# Patient Record
Sex: FEMALE | Race: BLACK OR AFRICAN AMERICAN
[De-identification: names, ages, dates, MRNs, and addresses within clinical notes are randomized per-mention and may not be internally consistent; named-entity substitution may affect disease eponyms.]

---

## 2018-03-29 ENCOUNTER — HOSPITAL ENCOUNTER (EMERGENCY)
Dept: HOSPITAL 74 - JERFT | Age: 78
Discharge: HOME | End: 2018-03-29
Payer: COMMERCIAL

## 2018-03-29 VITALS — SYSTOLIC BLOOD PRESSURE: 138 MMHG | TEMPERATURE: 97.9 F | HEART RATE: 98 BPM | DIASTOLIC BLOOD PRESSURE: 80 MMHG

## 2018-03-29 VITALS — BODY MASS INDEX: 23.8 KG/M2
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
## 2018-03-29 DIAGNOSIS — S82.831D: Primary | ICD-10-CM

## 2018-03-29 PROCEDURE — 2W3QX1Z IMMOBILIZATION OF RIGHT LOWER LEG USING SPLINT: ICD-10-PCS

## 2018-03-29 NOTE — PDOC
History of Present Illness





- General


Chief Complaint: Injury


Stated Complaint: RT ANKLE PAIN


Time Seen by Provider: 03/29/18 10:53


History Source: Patient


Exam Limitations: No Limitations





- History of Present Illness


Initial Comments: 





03/29/18 11:13


Patient came with daughter for evaluation of right ankle and leg pain. States 

twisted ankle 2 days ago. Was seen at Dr. Gunn's office, x-ray was taken and 

noted to have a distal fibular fracture. Was instructed to follow-up with 

orthopedist whom they called and were given appointment for 2 weeks.


03/29/18 12:08





Occurred: reports: yesterday


Severity: reports: mild, moderate


Pain Location: reports: none


Method of Injury: Yes: unknown


Modifying Factors: improves with: None, cold therapy


Loss of Consciousness: no loss of consciousness


Associated Symptoms (Fall): denies symptoms





Past History





- Travel


Traveled outside of the country in the last 30 days: No


Close contact w/someone who was outside of country & ill: No





- Past Medical History


Allergies/Adverse Reactions: 


 Allergies











Allergy/AdvReac Type Severity Reaction Status Date / Time


 


No Known Drug Allergies Allergy   Verified 03/29/18 10:44











Home Medications: 


Ambulatory Orders





Aspirin Coated [Ecotrin] 81 mg PO DAILY 11/25/12 


Amlodipine/Valsartan/Hcthiazid [Amlod-Valsa-Hctz -25 mg] 1 each PO DAILY 

02/26/16 


Oxybutynin Chloride 5 mg PO DAILY 02/26/16 


Cholecalciferol (Vitamin D3) [Vitamin D] 1,000 unit PO DAILY 06/24/16 


Cyanocobalamin [Vitamin B12 -] 1,000 mcg PO DAILY 06/24/16 


Memantine HCl [Namenda Xr] 28 mg PO HS 06/24/16 








Anemia: No


Asthma: No


Cancer: No


Cardiac Disorders: No


CVA: No


COPD: No


CHF: No


Dementia: No


Diabetes: No


GI Disorders: No


 Disorders: No


HTN: Yes


Hypercholesterolemia: No


Liver Disease: No


Seizures: No


Thyroid Disease: No





- Immunization History


Immunization Up to Date: Yes (FLU)





- Suicide/Smoking/Psychosocial Hx


Smoking Status: No


Smoking History: Never smoked


Number of Cigarettes Smoked Daily: 0


Hx Alcohol Use: No


Drug/Substance Use Hx: No


Substance Use Type: None





Trauma Specific PMHX





- Complaint Specific PMHX


Back Injury: No


Neck Injury: No





**Review of Systems





- Review of Systems


Able to Perform ROS?: Yes


Is the patient limited English proficient: Yes


Constitutional: Yes: Symptoms Reported, See HPI, Malaise


HEENTM: No: Symptoms Reported


Musculoskeletal: Yes: Symptoms Reported, See HPI, Joint Pain, Joint Swelling, 

Joint Stiffness


Integumentary: Yes: Symptoms Reported


Neurological: Yes: Symptoms reported, See HPI.  No: Numbness


All Other Systems: Reviewed and Negative





*Physical Exam





- Vital Signs


 Last Vital Signs











Temp Pulse Resp BP Pulse Ox


 


 97.9 F   98 H  17   138/80   99 


 


 03/29/18 10:44  03/29/18 10:44  03/29/18 10:44  03/29/18 10:44  03/29/18 10:44














- Physical Exam


General Appearance: Yes: Nourished, Appropriately Dressed, Apparent Distress


HEENT: positive: REI, Normal ENT Inspection, TMs Normal, Pharynx Normal


Neck: positive: Supple.  negative: Tender, Lymphadenopathy (R)


Respiratory/Chest: positive: Lungs Clear, Normal Breath Sounds


Gastrointestinal/Abdominal: positive: Soft.  negative: Normal Bowel Sounds


Musculoskeletal: positive: Normal Inspection, Decreased Range of Motion


Extremity: positive: Normal Capillary Refill, Normal Range of Motion, Tender, 

Swelling


Integumentary: positive: Swelling, Ecchymosis, Bruising


Neurologic: positive: CNs II-XII NML intact, Fully Oriented, Alert, Normal Mood/

Affect, Normal Response, Motor Strength 5/5





ED Treatment Course





- RADIOLOGY


Radiology Studies Ordered: 














 Category Date Time Status


 


 ANKLE-RIGHT [RAD] Stat Radiology  03/29/18 11:09 Ordered














Progress Note





- Progress Note


Progress Note: 





X-ray reveals distal fibular fracture with what seems to be some changes in the 

ankle mortise. Case was discussed with Dr. Hernandez who for further treatment 

including casting and walker instructions. Patient was discharged here in via 

wheelchair was taken to Dr. Hayden office for definitive





*DC/Admit/Observation/Transfer


Diagnosis at time of Disposition: 


Fracture of distal end of fibula


Qualifiers:


 Encounter type: initial encounter Fracture type: closed Fracture morphology: 

other fracture Laterality: right Qualified Code(s): S82.831A - Other fracture 

of upper and lower end of right fibula, initial encounter for closed fracture








- Discharge Dispostion


Disposition: HOME


Condition at time of disposition: Stable


Admit: No





- Referrals


Referrals: 


Velia,Kuldip, MD [Primary Care Provider] - 


Artemio Hernandez MD [Staff Physician] - 





- Patient Instructions


Printed Discharge Instructions:  DI for Ankle Fracture


Additional Instructions: 





Avoid heavy lifting or exercise until pain and swelling is resolved or until 

further directed


Keep area highly elevated to reduce swelling


Use splints/Ace wrap as directed 


Followup with orthopedist when directed 


    if significantly improved may wait one week for followup with orthopedist





May use ibuprofen 2-200 mg tablets every 6 hours as needed for pain





- Post Discharge Activity

## 2019-02-13 ENCOUNTER — HOSPITAL ENCOUNTER (INPATIENT)
Dept: HOSPITAL 74 - JER | Age: 79
LOS: 1 days | Discharge: HOME HEALTH | DRG: 690 | End: 2019-02-14
Attending: INTERNAL MEDICINE | Admitting: INTERNAL MEDICINE
Payer: COMMERCIAL

## 2019-02-13 VITALS — BODY MASS INDEX: 26.6 KG/M2

## 2019-02-13 DIAGNOSIS — F03.90: ICD-10-CM

## 2019-02-13 DIAGNOSIS — R00.0: ICD-10-CM

## 2019-02-13 DIAGNOSIS — D75.89: ICD-10-CM

## 2019-02-13 DIAGNOSIS — N39.0: Primary | ICD-10-CM

## 2019-02-13 DIAGNOSIS — R32: ICD-10-CM

## 2019-02-13 DIAGNOSIS — N18.9: ICD-10-CM

## 2019-02-13 DIAGNOSIS — M06.9: ICD-10-CM

## 2019-02-13 DIAGNOSIS — I12.9: ICD-10-CM

## 2019-02-13 DIAGNOSIS — Z98.61: ICD-10-CM

## 2019-02-13 DIAGNOSIS — I25.10: ICD-10-CM

## 2019-02-13 DIAGNOSIS — J06.9: ICD-10-CM

## 2019-02-13 LAB
ALBUMIN SERPL-MCNC: 3 G/DL (ref 3.4–5)
ALBUMIN SERPL-MCNC: 3.5 G/DL (ref 3.4–5)
ALP SERPL-CCNC: 60 U/L (ref 45–117)
ALP SERPL-CCNC: 75 U/L (ref 45–117)
ALT SERPL-CCNC: 23 U/L (ref 13–61)
ALT SERPL-CCNC: 32 U/L (ref 13–61)
ANION GAP SERPL CALC-SCNC: 3 MMOL/L (ref 8–16)
ANION GAP SERPL CALC-SCNC: 4 MMOL/L (ref 8–16)
APPEARANCE UR: CLEAR
APTT BLD: 31.9 SECONDS (ref 25.2–36.5)
AST SERPL-CCNC: 15 U/L (ref 15–37)
AST SERPL-CCNC: 20 U/L (ref 15–37)
BASOPHILS # BLD: 0.7 % (ref 0–2)
BASOPHILS # BLD: 1.2 % (ref 0–2)
BILIRUB SERPL-MCNC: 0.3 MG/DL (ref 0.2–1)
BILIRUB SERPL-MCNC: 0.3 MG/DL (ref 0.2–1)
BILIRUB UR STRIP.AUTO-MCNC: NEGATIVE MG/DL
BNP SERPL-MCNC: 170 PG/ML (ref 5–450)
BUN SERPL-MCNC: 14 MG/DL (ref 7–18)
BUN SERPL-MCNC: 16 MG/DL (ref 7–18)
CALCIUM SERPL-MCNC: 8.6 MG/DL (ref 8.5–10.1)
CALCIUM SERPL-MCNC: 9.4 MG/DL (ref 8.5–10.1)
CHLORIDE SERPL-SCNC: 105 MMOL/L (ref 98–107)
CHLORIDE SERPL-SCNC: 98 MMOL/L (ref 98–107)
CO2 SERPL-SCNC: 31 MMOL/L (ref 21–32)
CO2 SERPL-SCNC: 34 MMOL/L (ref 21–32)
COLOR UR: (no result)
CREAT SERPL-MCNC: 1.1 MG/DL (ref 0.55–1.3)
CREAT SERPL-MCNC: 1.3 MG/DL (ref 0.55–1.3)
DEPRECATED RDW RBC AUTO: 15.3 % (ref 11.6–15.6)
DEPRECATED RDW RBC AUTO: 15.4 % (ref 11.6–15.6)
EOSINOPHIL # BLD: 2.7 % (ref 0–4.5)
EOSINOPHIL # BLD: 3.1 % (ref 0–4.5)
EPITH CASTS URNS QL MICRO: (no result) /HPF
ERYTHROCYTE [SEDIMENTATION RATE] IN BLOOD BY WESTERGREN METHOD: 40 MM/HR (ref 0–30)
GLUCOSE SERPL-MCNC: 90 MG/DL (ref 74–106)
GLUCOSE SERPL-MCNC: 97 MG/DL (ref 74–106)
HCT VFR BLD CALC: 30.3 % (ref 32.4–45.2)
HCT VFR BLD CALC: 34.1 % (ref 32.4–45.2)
HGB BLD-MCNC: 10.4 GM/DL (ref 10.7–15.3)
HGB BLD-MCNC: 11.9 GM/DL (ref 10.7–15.3)
HYALINE CASTS URNS QL MICRO: 1 /LPF
INR BLD: 1.04 (ref 0.83–1.09)
KETONES UR QL STRIP: NEGATIVE
LEUKOCYTE ESTERASE UR QL STRIP.AUTO: (no result)
LYMPHOCYTES # BLD: 24.5 % (ref 8–40)
LYMPHOCYTES # BLD: 27.5 % (ref 8–40)
MAGNESIUM SERPL-MCNC: 2.3 MG/DL (ref 1.8–2.4)
MCH RBC QN AUTO: 33.7 PG (ref 25.7–33.7)
MCH RBC QN AUTO: 34.2 PG (ref 25.7–33.7)
MCHC RBC AUTO-ENTMCNC: 34.2 G/DL (ref 32–36)
MCHC RBC AUTO-ENTMCNC: 34.8 G/DL (ref 32–36)
MCV RBC: 98.4 FL (ref 80–96)
MCV RBC: 98.5 FL (ref 80–96)
MONOCYTES # BLD AUTO: 7.4 % (ref 3.8–10.2)
MONOCYTES # BLD AUTO: 8.9 % (ref 3.8–10.2)
NEUTROPHILS # BLD: 60.2 % (ref 42.8–82.8)
NEUTROPHILS # BLD: 63.8 % (ref 42.8–82.8)
NITRITE UR QL STRIP: NEGATIVE
PH BLDV: 7.39 [PH] (ref 7.32–7.42)
PH UR: 7 [PH] (ref 5–8)
PHOSPHATE SERPL-MCNC: 3 MG/DL (ref 2.5–4.9)
PLATELET # BLD AUTO: 275 K/MM3 (ref 134–434)
PLATELET # BLD AUTO: 304 K/MM3 (ref 134–434)
PMV BLD: 8.2 FL (ref 7.5–11.1)
PMV BLD: 8.4 FL (ref 7.5–11.1)
POTASSIUM SERPLBLD-SCNC: 3.7 MMOL/L (ref 3.5–5.1)
POTASSIUM SERPLBLD-SCNC: 4.4 MMOL/L (ref 3.5–5.1)
PROT SERPL-MCNC: 6.4 G/DL (ref 6.4–8.2)
PROT SERPL-MCNC: 7.6 G/DL (ref 6.4–8.2)
PROT UR QL STRIP: NEGATIVE
PROT UR QL STRIP: NEGATIVE
PT PNL PPP: 12.3 SEC (ref 9.7–13)
RBC # BLD AUTO: 3.08 M/MM3 (ref 3.6–5.2)
RBC # BLD AUTO: 3.47 M/MM3 (ref 3.6–5.2)
SODIUM SERPL-SCNC: 135 MMOL/L (ref 136–145)
SODIUM SERPL-SCNC: 139 MMOL/L (ref 136–145)
SP GR UR: 1.01 (ref 1.01–1.03)
UROBILINOGEN UR STRIP-MCNC: NEGATIVE MG/DL (ref 0.2–1)
VENOUS PC02: 54 MMHG (ref 38–52)
VENOUS PO2: 13.4 MMHG (ref 28–48)
WBC # BLD AUTO: 8.5 K/MM3 (ref 4–10)
WBC # BLD AUTO: 9.8 K/MM3 (ref 4–10)

## 2019-02-13 PROCEDURE — G0378 HOSPITAL OBSERVATION PER HR: HCPCS

## 2019-02-13 RX ADMIN — ASPIRIN SCH MG: 81 TABLET, COATED ORAL at 10:16

## 2019-02-13 RX ADMIN — CLOPIDOGREL BISULFATE SCH MG: 75 TABLET, FILM COATED ORAL at 10:16

## 2019-02-13 RX ADMIN — OXYBUTYNIN CHLORIDE SCH MG: 5 TABLET ORAL at 10:16

## 2019-02-13 RX ADMIN — IMIPRAMINE HYDROCHLORIDE SCH MG: 25 TABLET ORAL at 11:51

## 2019-02-13 RX ADMIN — SODIUM CHLORIDE SCH MLS/HR: 9 INJECTION, SOLUTION INTRAVENOUS at 03:12

## 2019-02-13 RX ADMIN — HEPARIN SODIUM SCH UNIT: 5000 INJECTION, SOLUTION INTRAVENOUS; SUBCUTANEOUS at 21:42

## 2019-02-13 RX ADMIN — VITAMIN D, TAB 1000IU (100/BT) SCH UNIT: 25 TAB at 10:16

## 2019-02-13 RX ADMIN — LOSARTAN POTASSIUM SCH MG: 25 TABLET, FILM COATED ORAL at 10:15

## 2019-02-13 RX ADMIN — METOPROLOL TARTRATE SCH MG: 25 TABLET, FILM COATED ORAL at 10:16

## 2019-02-13 RX ADMIN — AMLODIPINE BESYLATE SCH MG: 5 TABLET ORAL at 10:16

## 2019-02-13 RX ADMIN — HEPARIN SODIUM SCH UNIT: 5000 INJECTION, SOLUTION INTRAVENOUS; SUBCUTANEOUS at 10:16

## 2019-02-13 RX ADMIN — DONEPEZIL HYDROCHLORIDE SCH MG: 5 TABLET, FILM COATED ORAL at 10:18

## 2019-02-13 RX ADMIN — ALLOPURINOL SCH MG: 100 TABLET ORAL at 10:16

## 2019-02-13 NOTE — PN
Physical Exam: 


SUBJECTIVE: Patient seen and examined





No new issues overnight. 


States she came to hospital because of flu like symptoms and has no burning 

micturation or change in frequency. 


overnight no new issues 








OBJECTIVE:





 Vital Signs











 Period  Temp  Pulse  Resp  BP Sys/Veloz  Pulse Ox


 


 Last 24 Hr  98.4 F-101.6 F    16-20  127-146/86-88  99-99











GENERAL: a/o x 3, 


EYES: Pupils equal, round and reactive to light, 


EARS, NOSE, THROAT: moist  mucous membranes


NECK: supple without lymphadenopathy, JVD, or masses.


LUNGS: Breath sounds equal, clear to auscultation bilaterally. No wheezes, and 

no crackles. 


HEART: RRR, no murmurs appreciated


ABDOMEN: Soft, nontender, not distended, normoactive bowel sounds, no guarding, 

no rebound, no masses.


LOWER EXTREMITIES: 2+ pulses,  No peripheral edema. 


SKIN: dry














 Laboratory Results - last 24 hr











  02/13/19 02/13/19 02/13/19





  01:13 01:13 01:13


 


WBC  9.8  


 


RBC  3.47 L  


 


Hgb  11.9  


 


Hct  34.1  


 


MCV  98.5 H  


 


MCH  34.2 H  


 


MCHC  34.8  


 


RDW  15.4  D  


 


Plt Count  304  


 


MPV  8.4  


 


Absolute Neuts (auto)  6.3  


 


Neutrophils %  63.8  D  


 


Lymphocytes %  24.5  D  


 


Monocytes %  7.4  


 


Eosinophils %  3.1  


 


Basophils %  1.2  


 


Nucleated RBC %  0  


 


ESR   


 


PT with INR   12.30 


 


INR   1.04 


 


PTT (Actin FS)   31.9 


 


VBG pH    7.39


 


POC VBG pCO2    54.0 H


 


POC VBG pO2    13.4 L*


 


Mixed VBG HCO3    32.2 H


 


Sodium   


 


Potassium   


 


Chloride   


 


Carbon Dioxide   


 


Anion Gap   


 


BUN   


 


Creatinine   


 


Creat Clearance w eGFR   


 


Random Glucose   


 


Lactic Acid   


 


Calcium   


 


Phosphorus   


 


Magnesium   


 


Total Bilirubin   


 


AST   


 


ALT   


 


Alkaline Phosphatase   


 


Troponin I   


 


C-Reactive Protein   


 


B-Natriuretic Peptide   


 


Total Protein   


 


Albumin   


 


Urine Color   


 


Urine Appearance   


 


Urine pH   


 


Ur Specific Gravity   


 


Urine Protein   


 


Urine Glucose (UA)   


 


Urine Ketones   


 


Urine Blood   


 


Urine Nitrite   


 


Urine Bilirubin   


 


Urine Urobilinogen   


 


Ur Leukocyte Esterase   


 


Urine WBC (Auto)   


 


Urine RBC (Auto)   


 


Ur Epithelial Cells   


 


Hyaline Casts   


 


Influenza A (Rapid)   


 


Influenza B (Rapid)   














  02/13/19 02/13/19 02/13/19





  01:13 01:13 01:13


 


WBC   


 


RBC   


 


Hgb   


 


Hct   


 


MCV   


 


MCH   


 


MCHC   


 


RDW   


 


Plt Count   


 


MPV   


 


Absolute Neuts (auto)   


 


Neutrophils %   


 


Lymphocytes %   


 


Monocytes %   


 


Eosinophils %   


 


Basophils %   


 


Nucleated RBC %   


 


ESR   


 


PT with INR   


 


INR   


 


PTT (Actin FS)   


 


VBG pH   


 


POC VBG pCO2   


 


POC VBG pO2   


 


Mixed VBG HCO3   


 


Sodium  135 L  


 


Potassium  4.4  


 


Chloride  98  


 


Carbon Dioxide  34 H  


 


Anion Gap  3 L  


 


BUN  16  


 


Creatinine  1.3  


 


Creat Clearance w eGFR  39.41  


 


Random Glucose  97  


 


Lactic Acid   1.3 


 


Calcium  9.4  


 


Phosphorus   


 


Magnesium   


 


Total Bilirubin  0.3  


 


AST  20  


 


ALT  32  


 


Alkaline Phosphatase  75  


 


Troponin I    < 0.02


 


C-Reactive Protein   


 


B-Natriuretic Peptide   


 


Total Protein  7.6  


 


Albumin  3.5  


 


Urine Color   


 


Urine Appearance   


 


Urine pH   


 


Ur Specific Gravity   


 


Urine Protein   


 


Urine Glucose (UA)   


 


Urine Ketones   


 


Urine Blood   


 


Urine Nitrite   


 


Urine Bilirubin   


 


Urine Urobilinogen   


 


Ur Leukocyte Esterase   


 


Urine WBC (Auto)   


 


Urine RBC (Auto)   


 


Ur Epithelial Cells   


 


Hyaline Casts   


 


Influenza A (Rapid)   


 


Influenza B (Rapid)   














  02/13/19 02/13/19 02/13/19





  01:13 01:45 06:00


 


WBC    8.5


 


RBC    3.08 L


 


Hgb    10.4 L


 


Hct    30.3 L


 


MCV    98.4 H


 


MCH    33.7


 


MCHC    34.2


 


RDW    15.3


 


Plt Count    275


 


MPV    8.2


 


Absolute Neuts (auto)    5.2


 


Neutrophils %    60.2


 


Lymphocytes %    27.5


 


Monocytes %    8.9


 


Eosinophils %    2.7


 


Basophils %    0.7


 


Nucleated RBC %    0


 


ESR    40 H


 


PT with INR   


 


INR   


 


PTT (Actin FS)   


 


VBG pH   


 


POC VBG pCO2   


 


POC VBG pO2   


 


Mixed VBG HCO3   


 


Sodium   


 


Potassium   


 


Chloride   


 


Carbon Dioxide   


 


Anion Gap   


 


BUN   


 


Creatinine   


 


Creat Clearance w eGFR   


 


Random Glucose   


 


Lactic Acid   


 


Calcium   


 


Phosphorus   


 


Magnesium   


 


Total Bilirubin   


 


AST   


 


ALT   


 


Alkaline Phosphatase   


 


Troponin I   


 


C-Reactive Protein   


 


B-Natriuretic Peptide   


 


Total Protein   


 


Albumin   


 


Urine Color   Ltyellow 


 


Urine Appearance   Clear 


 


Urine pH   7.0 


 


Ur Specific Gravity   1.012 


 


Urine Protein   Negative 


 


Urine Glucose (UA)   Negative 


 


Urine Ketones   Negative 


 


Urine Blood   1+ H 


 


Urine Nitrite   Negative 


 


Urine Bilirubin   Negative 


 


Urine Urobilinogen   Negative 


 


Ur Leukocyte Esterase   3+ H 


 


Urine WBC (Auto)   57 


 


Urine RBC (Auto)   22 


 


Ur Epithelial Cells   Rare 


 


Hyaline Casts   1 


 


Influenza A (Rapid)  Negative  


 


Influenza B (Rapid)  Negative  














  02/13/19





  06:00


 


WBC 


 


RBC 


 


Hgb 


 


Hct 


 


MCV 


 


MCH 


 


MCHC 


 


RDW 


 


Plt Count 


 


MPV 


 


Absolute Neuts (auto) 


 


Neutrophils % 


 


Lymphocytes % 


 


Monocytes % 


 


Eosinophils % 


 


Basophils % 


 


Nucleated RBC % 


 


ESR 


 


PT with INR 


 


INR 


 


PTT (Actin FS) 


 


VBG pH 


 


POC VBG pCO2 


 


POC VBG pO2 


 


Mixed VBG HCO3 


 


Sodium  139


 


Potassium  3.7


 


Chloride  105


 


Carbon Dioxide  31


 


Anion Gap  4 L


 


BUN  14


 


Creatinine  1.1


 


Creat Clearance w eGFR  47.79


 


Random Glucose  90


 


Lactic Acid 


 


Calcium  8.6


 


Phosphorus  3.0


 


Magnesium  2.3


 


Total Bilirubin  0.3


 


AST  15


 


ALT  23


 


Alkaline Phosphatase  60


 


Troponin I 


 


C-Reactive Protein  9.0 H


 


B-Natriuretic Peptide  170.0


 


Total Protein  6.4


 


Albumin  3.0 L


 


Urine Color 


 


Urine Appearance 


 


Urine pH 


 


Ur Specific Gravity 


 


Urine Protein 


 


Urine Glucose (UA) 


 


Urine Ketones 


 


Urine Blood 


 


Urine Nitrite 


 


Urine Bilirubin 


 


Urine Urobilinogen 


 


Ur Leukocyte Esterase 


 


Urine WBC (Auto) 


 


Urine RBC (Auto) 


 


Ur Epithelial Cells 


 


Hyaline Casts 


 


Influenza A (Rapid) 


 


Influenza B (Rapid) 








Active Medications











Generic Name Dose Route Start Last Admin





  Trade Name Freq  PRN Reason Stop Dose Admin


 


Acetaminophen  650 mg  02/13/19 03:18  





  Tylenol -  PO   





  Q6H PRN   





  FEVER   





     





     





     


 


Allopurinol  100 mg  02/13/19 10:00  02/13/19 10:16





  Zyloprim -  PO   100 mg





  DAILY JUAN   Administration





     





     





     





     


 


Amlodipine Besylate  5 mg  02/13/19 10:00  02/13/19 10:16





  Norvasc -  PO   5 mg





  DAILY JUAN   Administration





     





     





     





     


 


Aspirin  81 mg  02/13/19 10:00  02/13/19 10:16





  Ecotrin -  PO   81 mg





  DAILY JUAN   Administration





     





     





     





     


 


Cholecalciferol  1,000 unit  02/13/19 10:00  02/13/19 10:16





  Vitamin D3 -  PO   1,000 unit





  DAILY JUAN   Administration





     





     





     





     


 


Clopidogrel Bisulfate  75 mg  02/13/19 10:00  02/13/19 10:16





  Plavix -  PO   75 mg





  DAILY JUAN   Administration





     





     





     





     


 


Donepezil HCl  5 mg  02/13/19 10:00  02/13/19 10:18





  Aricept -  PO   5 mg





  DAILY JUAN   Administration





     





     





     





     


 


Heparin Sodium (Porcine)  5,000 unit  02/13/19 10:00  02/13/19 10:16





  Heparin -  SQ   5,000 unit





  BID JUAN   Administration





     





     





     





     


 


Sodium Chloride  1,000 mls @ 75 mls/hr  02/13/19 03:00  02/13/19 03:12





  Normal Saline -  IV   75 mls/hr





  ASDIR JUAN   Administration





     





     





     





     


 


Ceftriaxone Sodium 1 gm/  50 mls @ 100 mls/hr  02/13/19 22:00  





  Dextrose  IVPB   





  [facility]   





     





     





  Protocol   





     


 


Imipramine HCl  50 mg  02/13/19 10:00  





  Tofranil -  PO   





  DAILY JUAN   





     





     





     





     


 


Losartan Potassium  25 mg  02/13/19 10:00  02/13/19 10:15





  Cozaar -  PO   25 mg





  DAILY JUAN   Administration





     





     





     





     


 


Metoprolol Tartrate  25 mg  02/13/19 10:00  02/13/19 10:16





  Lopressor -  PO   25 mg





  DAILY JUAN   Administration





     





     





     





     


 


Non-Formulary Medication  1 each  02/13/19 10:00  





  Oxybutynin [Oxytrol]  TD   





  DAILY [facility]   





     





     





     





     


 


Oxybutynin Chloride  5 mg  02/13/19 10:00  02/13/19 10:16





  Ditropan -  PO   5 mg





  DAILY JUAN   Administration





     





     





     





     











ASSESSMENT/PLAN:79 yo F with a PMHx of HTN, a "muscle disease", MI s/p 2 stents(

7/18), presented with cough and fever and found to be septic.





#uti 


-one reading of fever since yesterday 


-UA: 3+ LE, 57 WBC


- conitinue IV antibiotic. We will wait for culture 


-75ml/hour NS. Stop once pt has good po intake 


-Tylenol PRN for fever








#CKD


-at baseline





#CAD/HTN 


-cont. Plavix 75mg


-cont Amlodipine 5mg


-Cont. Losartan 25


-Cont. Toprol 25


- continue aspirin


-med rec patient





#RA


hold methotrexate for now can take it from next week 





#Urinary incontinence


-cont. home oxybutynin





#FEN


-NS @75


-monitor


-sodium restricted





#DVT


-heparin sq








Visit type





- Emergency Visit


Emergency Visit: Yes


ED Registration Date: 02/13/19


Care time: The patient presented to the Emergency Department on the above date 

and was hospitalized for further evaluation of their emergent condition.





- New Patient


This patient is new to me today: Yes


Date on this admission: 02/13/19





- Critical Care


Critical Care patient: No

## 2019-02-13 NOTE — PDOC
History of Present Illness





- General


Chief Complaint: SIRS, Suspected/Possible


Stated Complaint: FEVER/WEAKNESS


Time Seen by Provider: 02/13/19 01:05


History Source: Patient, Family


Exam Limitations: No Limitations





- History of Present Illness


Initial Comments: 





02/13/19 02:03


The patient is an 78F with a PMH of HTN, mild dementia, and RA who presents to 

the ER with complaints of fever. The patient states that she's had intermittent 

fevers x 1.5 weeks with a cough. She denies CP, SOB, nausea, vomiting, dyusuria

, hematuria, chills. She denies any other symptoms. 





Past History





- Past Medical History


Allergies/Adverse Reactions: 


 Allergies











Allergy/AdvReac Type Severity Reaction Status Date / Time


 


No Known Drug Allergies Allergy Intermediate  Verified 02/13/19 01:55











Home Medications: 


Ambulatory Orders





Aspirin Coated [Ecotrin] 81 mg PO DAILY 11/25/12 


Amlodipine/Valsartan/Hcthiazid [Amlod-Valsa-Hctz -25 mg] 1 each PO DAILY 

02/26/16 


Cholecalciferol (Vitamin D3) [Vitamin D] 1,000 unit PO DAILY 06/24/16 


Cyanocobalamin [Vitamin B12 -] 1,000 mcg PO DAILY 06/24/16 


Donepezil HCl [Aricept -] 5 mg PO DAILY 07/15/18 


Imipramine HCl 50 mg PO DAILY 07/15/18 


Meloxicam 15 mg PO DAILY 07/15/18 


Methotrexate Sodium [Methotrexate] 20 mg PO WEEKLY 07/15/18 


Atorvastatin Ca [Lipitor] 10 mg PO HS 02/13/19 


Clopidogrel Bisulfate [Plavix] 75 mg PO DAILY 02/13/19 


Losartan Potassium 25 mg PO DAILY 02/13/19 


Metoprolol Tartrate 25 mg PO DAILY 02/13/19 


Oxybutynin [Oxytrol] 1 each TD DAILY 02/13/19 








Anemia: No


Asthma: No


Cancer: No


Cardiac Disorders: No


CVA: No


COPD: No


CHF: No


Dementia: No


Diabetes: No


GI Disorders: No


 Disorders: No


HTN: Yes


Hypercholesterolemia: No


Liver Disease: No


Seizures: No


Thyroid Disease: No





- Immunization History


Immunization Up to Date: Yes (FLU)





- Suicide/Smoking/Psychosocial Hx


Smoking Status: No


Smoking History: Unknown if ever smoked


Have you smoked in the past 12 months: No


Number of Cigarettes Smoked Daily: 0


Hx Alcohol Use: No


Drug/Substance Use Hx: No


Substance Use Type: None





**Review of Systems





- Review of Systems


Able to Perform ROS?: Yes


Comments:: 





02/13/19 02:24


GENERAL/CONSTITUTIONAL: Positive for fever. No chills. No weakness.


HEAD, EYES, EARS, NOSE AND THROAT: No change in vision. No ear pain or 

discharge. No sore throat.


CARDIOVASCULAR: No chest pain, palpitations, or lightheadedness.


RESPIRATORY: Positive for cough. No wheezing, shortness of breath, or 

hemoptysis.


GASTROINTESTINAL: No nausea, vomiting, diarrhea, constipation, or abdominal 

pain. 


GENITOURINARY: No dysuria, frequency, hematuria, or change in urination.


MUSCULOSKELETAL: No joint or muscle swelling or pain. No neck or back pain.


SKIN: No rash or lesions. 


NEUROLOGIC: No headache, numbness, tingling, focal weakness, loss of 

consciousness, or change in strength/sensation.





Is the patient limited English proficient: No





*Physical Exam





- Vital Signs


 Last Vital Signs











Temp Pulse Resp BP Pulse Ox


 


 99.1 F   109 H  16   127/88   99 


 


 02/13/19 00:39  02/13/19 00:39  02/13/19 00:39  02/13/19 00:39  02/13/19 00:39














- Physical Exam


Comments: 





02/13/19 02:32


GENERAL: Well developed, well nourished. Awake and alert. No acute distress.


HEENT: Normocephalic, atraumatic. Hearing grossly normal. Moist mucous 

membranes. PERRLA, EOMI. No conjunctival pallor. 


NECK: Supple. Full ROM. No JVD. 


CARDIOVASCULAR: Regular rate and rhythm. No murmurs, rubs, or gallops. 


PULMONARY: No evidence of respiratory distress. Lungs clear to auscultation 

bilaterally. No wheezing, rales or rhonchi.


ABDOMINAL: Soft. Non-tender. Non-distended. No rebound or guarding.


GENITOURINARY: No CVA tenderness bilaterally.


MUSCULOSKELETAL: Normal range of motion at all joints. No bony deformities or 

tenderness. 


EXTREMITIES: No cyanosis. No clubbing. No edema. No calf tenderness or swelling.


SKIN: Warm and dry. Normal capillary refill. No rashes. No jaundice. 


NEUROLOGICAL: Alert, awake, appropriate. Cranial nerves 2-12 grossly intact. 

Normal speech. 


PSYCHIATRIC: Cooperative. Good eye contact. Appropriate mood and affect.








Moderate Sedation





- Procedure Monitoring


Vital Signs: 


Procedure Monitoring Vital Signs











Temperature  99.1 F   02/13/19 00:39


 


Pulse Rate  109 H  02/13/19 00:39


 


Respiratory Rate  16   02/13/19 00:39


 


Blood Pressure  127/88   02/13/19 00:39


 


O2 Sat by Pulse Oximetry (%)  99   02/13/19 00:39











ED Treatment Course





- LABORATORY


CBC & Chemistry Diagram: 


 02/13/19 01:13





 02/13/19 01:13





- ADDITIONAL ORDERS


Additional order review: 


 Laboratory  Results











  02/13/19 02/13/19 02/13/19





  01:13 01:13 01:13


 


PT with INR   


 


INR   


 


PTT (Actin FS)   


 


VBG pH   


 


POC VBG pCO2   


 


POC VBG pO2   


 


Mixed VBG HCO3   


 


Sodium    135 L


 


Potassium    4.4


 


Chloride    98


 


Carbon Dioxide    34 H


 


Anion Gap    3 L


 


BUN    16


 


Creatinine    1.3


 


Creat Clearance w eGFR    39.41


 


Random Glucose    97


 


Lactic Acid   1.3 


 


Calcium    9.4


 


Total Bilirubin    0.3


 


AST    20


 


ALT    32


 


Alkaline Phosphatase    75


 


Troponin I  < 0.02  


 


Total Protein    7.6


 


Albumin    3.5














  02/13/19 02/13/19





  01:13 01:13


 


PT with INR   12.30


 


INR   1.04


 


PTT (Actin FS)   31.9


 


VBG pH  7.39 


 


POC VBG pCO2  54.0 H 


 


POC VBG pO2  13.4 L* 


 


Mixed VBG HCO3  32.2 H 


 


Sodium  


 


Potassium  


 


Chloride  


 


Carbon Dioxide  


 


Anion Gap  


 


BUN  


 


Creatinine  


 


Creat Clearance w eGFR  


 


Random Glucose  


 


Lactic Acid  


 


Calcium  


 


Total Bilirubin  


 


AST  


 


ALT  


 


Alkaline Phosphatase  


 


Troponin I  


 


Total Protein  


 


Albumin  








 











  02/13/19





  01:13


 


RBC  3.47 L


 


MCV  98.5 H


 


MCHC  34.8


 


RDW  15.4  D


 


MPV  8.4


 


Neutrophils %  63.8  D


 


Lymphocytes %  24.5  D


 


Monocytes %  7.4


 


Eosinophils %  3.1


 


Basophils %  1.2














- RADIOLOGY


Radiology Studies Ordered: 














 Category Date Time Status


 


 CHEST X-RAY PORTABLE* [RAD] Stat Radiology  02/13/19 01:08 Taken














- Medications


Given in the ED: 


ED Medications














Discontinued Medications














Generic Name Dose Route Start Last Admin





  Trade Name Freq  PRN Reason Stop Dose Admin


 


Acetaminophen  1,000 mg  02/13/19 01:09  02/13/19 01:27





  Ofirmev Injection -  IVPB  02/13/19 01:10  1,000 mg





  ONCE ONE   Administration





     





     





     





     














Medical Decision Making





- Medical Decision Making





02/13/19 02:32


The patient is an 78F with a PMH of HTN who presents to the ER with 1 week of 

fever and cough concerning for PNA vs other infectious source. Pt noted to be 

febrile and tachycardic, 2/4 SIRS criteria. Giving IV tylenol.





Pt found to have UTI. Will give abx.





Hospitalist microblogged for admission.








*DC/Admit/Observation/Transfer


Diagnosis at time of Disposition: 


Sepsis


Qualifiers:


 Sepsis type: sepsis due to unspecified organism Qualified Code(s): A41.9 - 

Sepsis, unspecified organism





UTI (urinary tract infection)


Qualifiers:


 Urinary tract infection type: site unspecified Hematuria presence: without 

hematuria Qualified Code(s): N39.0 - Urinary tract infection, site not specified








- Discharge Dispostion


Condition at time of disposition: Guarded


Decision to Admit order: Yes





- Referrals


Referrals: 


Kuldip Gunn MD [Primary Care Provider] - 





- Patient Instructions





- Post Discharge Activity

## 2019-02-13 NOTE — PDOC
Attending Attestation





- HPI


HPI: 





02/13/19 01:42


The patient is a 78 year old female, with a significant past medical history of 

hypertension, rheumatoid arthritis, who presents to the emergency department 

with, a week of a cough and intermittent fevers.





She denies recent headache or dizziness. She denies recent nausea, vomit, 

diarrhea or constipation. She denies recent  dysuria, frequency, urgency or 

hematuria. She denies recent chest pain or shortness of breath.





Allergies: NKDA 


Primary Care Physician: Dr. Gunn











- Physicial Exam


PE: 





02/13/19 01:43


Agree with resident exam. 





<Kimberly Al - Last Filed: 02/13/19 01:42>





- Resident


Resident Name: Nathaniel Ruby)





- ED Attending Attestation


I have performed the following: I have examined & evaluated the patient, The 

case was reviewed & discussed with the resident, I agree w/resident's findings 

& plan





- Critical Care Time


Total Critical Care Time: 40


Critical Care Statement: The care of this patient involved high complexity 

decision making to prevent further life threatening deterioration of the patient

's condition and/or to evaluate & treat vital organ system(s) failure or risk 

of failure.





- Medical Decision Making





02/13/19 03:37


78-year-old female with fevers and cough


Labs consistent with urinary tract infection


Patient will be admitted for sepsis, IV antibiotics initiated as well as IV 

fluids





<Otilia Bright - Last Filed: 02/13/19 03:43>





Attestations





- Attestations





02/13/19 01:43





Documentation prepared by Kimberly Al, acting as medical scribe for 

Otilia Bright DO.





<Kimberly Al - Last Filed: 02/13/19 01:42>

## 2019-02-13 NOTE — HP
CHIEF COMPLAINT: cough and fever





PCP:Dr. Gunn


Rheumatologist: Dr. High


Cardiologist: Dr. Dede Stewart





HISTORY OF PRESENT ILLNESS: 





Patient is an 79 yo F with a PMHx of HTN, a "muscle disease", MI s/p 2 stents(7/ 18), presented because of a cough with white sputum production and intermittent 

fevers over the last week. She also complains of chills and a stuffy nose that 

started the same time.  She denies sick contacts and recent travel. She lives 

with her daughter. She denies urinary symptoms except for her chronic urinary 

incontinence. Daughter says patient also has not been drinking water and thinks 

her mother is dehydrated. Patient denies nausea, vomiting, sob, headaches, 

chest pain, numbness, tingling, diarrhea, decrease in appetite. 








ER course was notable for:


(1) 101.6 temp, tachy 112


(2) UA: 3+ LE, 57 WBC





Recent Travel: denies





PAST MEDICAL HISTORY: per hpi











Social History:


Smoking: denies


Alcohol: denies


Drugs: denies





Family History:


Allergies





No Known Drug Allergies Allergy (Intermediate, Verified 02/13/19 01:55)


 








HOME MEDICATIONS:


 Home Medications











 Medication  Instructions  Recorded


 


Aspirin Coated [Ecotrin] 81 mg PO DAILY 11/25/12


 


Amlodipine/Valsartan/Hcthiazid 1 each PO DAILY 02/26/16





[Amlod-Valsa-Hctz -25 mg]  


 


Oxybutynin Chloride 5 mg PO DAILY 02/26/16


 


Cholecalciferol (Vitamin D3) 1,000 unit PO DAILY 06/24/16





[Vitamin D]  


 


Cyanocobalamin [Vitamin B12 -] 1,000 mcg PO DAILY 06/24/16


 


Donepezil HCl [Aricept -] 5 mg PO DAILY 07/15/18


 


Imipramine HCl 50 mg PO DAILY 07/15/18


 


Meloxicam 15 mg PO DAILY 07/15/18


 


Methotrexate Sodium [Methotrexate] 20 mg PO WEEKLY 07/15/18


 


Amlodipine Besylate 5 mg PO DAILY 02/13/19


 


Atorvastatin Ca [Lipitor] 10 mg PO HS 02/13/19


 


Clopidogrel Bisulfate [Plavix] 75 mg PO DAILY 02/13/19


 


Imipramine HCl [Tofranil] 10 mg PO DAILY 02/13/19


 


Losartan Potassium 25 mg PO DAILY 02/13/19


 


Metoprolol Tartrate 25 mg PO DAILY 02/13/19


 


Oxybutynin [Oxytrol] 1 each TD DAILY 02/13/19








REVIEW OF SYSTEMS


CONSTITUTIONAL: fevers, chills


Absent:  diaphoresis, generalized weakness, malaise, loss of appetite, weight 

change


HEENT: nasal congestion, nasal congestion,


Absent:  rhinorrhea,  throat pain, throat swelling, difficulty swallowing, 

mouth swelling, ear pain, eye pain, visual changes


CARDIOVASCULAR: 


Absent: chest pain, syncope, palpitations, irregular heart rate, lightheadedness

, peripheral edema


RESPIRATORY: cough


Absent:shortness of breath, dyspnea with exertion, orthopnea, wheezing, stridor

, hemoptysis


GASTROINTESTINAL:


Absent: abdominal pain, abdominal distension, nausea, vomiting, diarrhea, 

constipation, melena, hematochezia


GENITOURINARY: frequency (chronic)


Absent: dysuria, frequency, urgency, hesitancy, hematuria, flank pain, genital 

pain


MUSCULOSKELETAL: 


Absent: myalgia, arthralgia, joint swelling, back pain, neck pain


NEUROLOGIC: dizziness


Absent: headache, focal weakness or paresthesias, unsteady gait, seizure, 

mental status changes, bladder or bowel incontinence


PSYCHIATRIC: 


Absent: anxiety, depression, suicidal or homicidal ideation, hallucinations.








PHYSICAL EXAMINATION


 Vital Signs - 24 hr











  02/13/19 02/13/19





  00:39 02:01


 


Temperature 99.1 F 101.6 F H


 


Pulse Rate 109 H 112 H


 


Respiratory 16 20





Rate  


 


Blood Pressure 127/88 130/88


 


O2 Sat by Pulse 99 99





Oximetry (%)  











GENERAL: a/o x 3, in NAD


EYES: Pupils equal, round and reactive to light, extraocular movements intact, 

sclera anicteric, conjunctiva clear.


EARS, NOSE, THROAT: Ears normal, nares patent, oropharynx clear without 

exudates. Dry mucous membranes


NECK: supple without lymphadenopathy, JVD, or masses.


LUNGS: Breath sounds equal, clear to auscultation bilaterally. No wheezes, and 

no crackles. 


HEART: RRR, no murmurs appreciated


ABDOMEN: Soft, nontender, not distended, normoactive bowel sounds, no guarding, 

no rebound, no masses.


LOWER EXTREMITIES: 2+ pulses,  No peripheral edema. 


NEUROLOGICAL:  Cranial nerves II-XII intact. Normal speech.


SKIN: decreased turgor


 Laboratory Results - last 24 hr











  02/13/19 02/13/19 02/13/19





  01:13 01:13 01:13


 


WBC  9.8  


 


RBC  3.47 L  


 


Hgb  11.9  


 


Hct  34.1  


 


MCV  98.5 H  


 


MCH  34.2 H  


 


MCHC  34.8  


 


RDW  15.4  D  


 


Plt Count  304  


 


MPV  8.4  


 


Absolute Neuts (auto)  6.3  


 


Neutrophils %  63.8  D  


 


Lymphocytes %  24.5  D  


 


Monocytes %  7.4  


 


Eosinophils %  3.1  


 


Basophils %  1.2  


 


Nucleated RBC %  0  


 


PT with INR   12.30 


 


INR   1.04 


 


PTT (Actin FS)   31.9 


 


VBG pH    7.39


 


POC VBG pCO2    54.0 H


 


POC VBG pO2    13.4 L*


 


Mixed VBG HCO3    32.2 H


 


Sodium   


 


Potassium   


 


Chloride   


 


Carbon Dioxide   


 


Anion Gap   


 


BUN   


 


Creatinine   


 


Creat Clearance w eGFR   


 


Random Glucose   


 


Lactic Acid   


 


Calcium   


 


Total Bilirubin   


 


AST   


 


ALT   


 


Alkaline Phosphatase   


 


Troponin I   


 


Total Protein   


 


Albumin   


 


Urine Color   


 


Urine Appearance   


 


Urine pH   


 


Ur Specific Gravity   


 


Urine Protein   


 


Urine Glucose (UA)   


 


Urine Ketones   


 


Urine Blood   


 


Urine Nitrite   


 


Urine Bilirubin   


 


Urine Urobilinogen   


 


Ur Leukocyte Esterase   


 


Urine WBC (Auto)   


 


Urine RBC (Auto)   


 


Ur Epithelial Cells   


 


Hyaline Casts   


 


Influenza A (Rapid)   


 


Influenza B (Rapid)   














  02/13/19 02/13/19 02/13/19





  01:13 01:13 01:13


 


WBC   


 


RBC   


 


Hgb   


 


Hct   


 


MCV   


 


MCH   


 


MCHC   


 


RDW   


 


Plt Count   


 


MPV   


 


Absolute Neuts (auto)   


 


Neutrophils %   


 


Lymphocytes %   


 


Monocytes %   


 


Eosinophils %   


 


Basophils %   


 


Nucleated RBC %   


 


PT with INR   


 


INR   


 


PTT (Actin FS)   


 


VBG pH   


 


POC VBG pCO2   


 


POC VBG pO2   


 


Mixed VBG HCO3   


 


Sodium  135 L  


 


Potassium  4.4  


 


Chloride  98  


 


Carbon Dioxide  34 H  


 


Anion Gap  3 L  


 


BUN  16  


 


Creatinine  1.3  


 


Creat Clearance w eGFR  39.41  


 


Random Glucose  97  


 


Lactic Acid   1.3 


 


Calcium  9.4  


 


Total Bilirubin  0.3  


 


AST  20  


 


ALT  32  


 


Alkaline Phosphatase  75  


 


Troponin I    < 0.02


 


Total Protein  7.6  


 


Albumin  3.5  


 


Urine Color   


 


Urine Appearance   


 


Urine pH   


 


Ur Specific Gravity   


 


Urine Protein   


 


Urine Glucose (UA)   


 


Urine Ketones   


 


Urine Blood   


 


Urine Nitrite   


 


Urine Bilirubin   


 


Urine Urobilinogen   


 


Ur Leukocyte Esterase   


 


Urine WBC (Auto)   


 


Urine RBC (Auto)   


 


Ur Epithelial Cells   


 


Hyaline Casts   


 


Influenza A (Rapid)   


 


Influenza B (Rapid)   














  02/13/19 02/13/19





  01:13 01:45


 


WBC  


 


RBC  


 


Hgb  


 


Hct  


 


MCV  


 


MCH  


 


MCHC  


 


RDW  


 


Plt Count  


 


MPV  


 


Absolute Neuts (auto)  


 


Neutrophils %  


 


Lymphocytes %  


 


Monocytes %  


 


Eosinophils %  


 


Basophils %  


 


Nucleated RBC %  


 


PT with INR  


 


INR  


 


PTT (Actin FS)  


 


VBG pH  


 


POC VBG pCO2  


 


POC VBG pO2  


 


Mixed VBG HCO3  


 


Sodium  


 


Potassium  


 


Chloride  


 


Carbon Dioxide  


 


Anion Gap  


 


BUN  


 


Creatinine  


 


Creat Clearance w eGFR  


 


Random Glucose  


 


Lactic Acid  


 


Calcium  


 


Total Bilirubin  


 


AST  


 


ALT  


 


Alkaline Phosphatase  


 


Troponin I  


 


Total Protein  


 


Albumin  


 


Urine Color   Ltyellow


 


Urine Appearance   Clear


 


Urine pH   7.0


 


Ur Specific Gravity   1.012


 


Urine Protein   Negative


 


Urine Glucose (UA)   Negative


 


Urine Ketones   Negative


 


Urine Blood   1+ H


 


Urine Nitrite   Negative


 


Urine Bilirubin   Negative


 


Urine Urobilinogen   Negative


 


Ur Leukocyte Esterase   3+ H


 


Urine WBC (Auto)   57


 


Urine RBC (Auto)   22


 


Ur Epithelial Cells   Rare


 


Hyaline Casts   1


 


Influenza A (Rapid)  Negative 


 


Influenza B (Rapid)  Negative 











ASSESSMENT/PLAN:





79 yo F with a PMHx of HTN, a "muscle disease", MI s/p 2 stents(7/18), 

presented with cough and fever and found to be septic.





#Sepsis 2/2 to UTI + URI


-101.6 temp, tachy 112


-UA: 3+ LE, 57 WBC


-IV abx: 2gm Ceftriaxone in ED


-Cont. 1gm Ceftriaxone daily


-Bcx, Ucx, sputum cx, respiratory vital panel


-IV fluids: NS 500ml bolus.


-75ml/hour NS


-Tylenol PRN for fever








#CKD


-at baseline





#CAD/HTN 


-cont. Plavix 75mg


-cont Amlodipine 5mg


-Cont. Losartan 25


-Cont. Toprol 25


-med rec patient





#RA


-stable


-med rec to check if patient on methotrexate





#Urinary incontinence


-cont. home oxybutynin





#FEN


-NS @75


-monitor


-sodium restricted





#DVT


-heparin sq








dispo: med-surge











Visit type





- Emergency Visit


Emergency Visit: Yes


ED Registration Date: 02/13/19


Care time: The patient presented to the Emergency Department on the above date 

and was hospitalized for further evaluation of their emergent condition.





- New Patient


This patient is new to me today: Yes


Date on this admission: 02/14/19





- Critical Care


Critical Care patient: No

## 2019-02-13 NOTE — PN
Teaching Attending Note


Name of Resident: Katarina Lara





ATTENDING PHYSICIAN STATEMENT





I saw and evaluated the patient.


I reviewed the resident's note and discussed the case with the resident.


I agree with the resident's findings and plan as documented.








SUBJECTIVE:


Seen and examined; please refer to resident note for further historical 

documentation.  Briefly, this is a 79 y/o female presenting to the ER with a CC 

of cough and intermittent fevers.  She has a full bladder when I saw her which 

caused some SP discomfort to palpation but otherwise she denies any urinary 

symptoms.  She tells me that she has had the cough/malaise/subjective fevers 

for several days and that nothing makes them better or worse, hasn't seen any 

other providers for this, and no recent antibiotics.  She is febrile with a 

stable BP in the ER but did have some tachycardia noted that improved with 

hydration.  Due to being SIRS+ with likely UTI and given her comorbidities she 

will be brought to the medicine service on observation.





10 sys ROS done and negative aside from HPI


PMH (HTN, NSTEMI 7/2018 s/p PCI, Dementia, OAB, Rheumatoid Arthritis), PSH, 

Social hx, Family hx reviewed


Medication reconciliation pending








OBJECTIVE:


VS, labs, imaging reviewed


NAD, AAO, resting in bed on RA in good spirits


[facility] +BS; had some suprapubic discomfort but she told be it was because she 

had to urinate


Tachycardic with regular rhythm, no mgr


Lungs CTAB, w/ sym exp


CN2-12 wnl, no fnd


Normal mood, appropriate affect








ASSESSMENT AND PLAN:


Patient presents with cough and intermittent fevers found to be SIRS+ (tachy, 

febrile) with positive UA but questionably no urinary symptoms.  She will be 

placed on observation and monitored.





1) SIRS+ 


-UA indicates potential urinary source but must be mindful of her URI sx; 

followup viral swab and was treated empirically with ceftriaxone (given 2g as 

on MTX).  Benign lung exam and satting 99% on RA. 


-Empiric hydration; will discuss with her PCP if they have any echo on file and 

will be gentle in the interem, but she does look dry.


-Followup cultures; continue empiric tx. Checking ESR/CRP





2) CAD w/ hx NSTEMI s/p cath 7/2018


-Discuss with primary service regarding obtaining old records; weren't 

available overnight.


-Continue home meds (placvix, ASA, BB, statin)


-No sree cardiac sx at this juncture





3) Hx RA


-Continue home meds





4) HTN


-Continue home meds; keep <160.  





5) OAB


-Continue oxybutynin





6) Dementia


-Continue Aricept





7) Reduced GFR


-Chronic; trend BMP





8) Macrocytosis


-Check B12/folate





FENA


-isotonic @75


-PRN replete


-Cardiac diet


-As tolerated

## 2019-02-13 NOTE — EKG
Test Reason : 

Blood Pressure : ***/*** mmHG

Vent. Rate : 095 BPM     Atrial Rate : 095 BPM

   P-R Int : 152 ms          QRS Dur : 072 ms

    QT Int : 340 ms       P-R-T Axes : 043 -21 067 degrees

   QTc Int : 427 ms

 

NORMAL SINUS RHYTHM

NONSPECIFIC T WAVE ABNORMALITY

ABNORMAL ECG

WHEN COMPARED WITH ECG OF 15-JUL-2018 12:49,

NONSPECIFIC T WAVE ABNORMALITY HAS REPLACED INVERTED T WAVES IN 

LATERAL LEADS

Confirmed by ELLIE JUSTICE, VANDANA ([id number]) on 2/13/2019 9:51:28 AM

 

Referred By:             Confirmed By:VANDANA ARGUETA MD

## 2019-02-13 NOTE — PN
Teaching Attending Note


Name of Resident: Kit Berry





ATTENDING PHYSICIAN STATEMENT





I saw and evaluated the patient.


I reviewed the resident's note and discussed the case with the resident.


I agree with the resident's findings and plan as documented with exceptions 

below.








SUBJECTIVE:


Patient seen and examined. reports cough with whitish sputum, sick contact, 

daughter with recent 'cold'. no abdominal or urinary symptoms. 





OBJECTIVE:


 Vital Signs











 Period  Temp  Pulse  Resp  BP Sys/Veloz  Pulse Ox


 


 Last 24 Hr  98.4 F-101.6 F    16-20  127-146/86-88  99-99








 Intake & Output











 02/10/19 02/11/19 02/12/19 02/13/19





 23:59 23:59 23:59 23:59


 


Weight    150 lb 2.157 oz








General: sitting in bed in no acute distress


Chest: CTAB, no rales or wheezing


HEENT: no pharyngeal erythema or tonsillar enlargement noted


Abdomen: soft, NT, no suprapubic or CVA tenderness


Extremities: no edema





 Home Medications











 Medication  Instructions  Recorded


 


Aspirin Coated [Ecotrin] 81 mg PO DAILY 11/25/12


 


Amlodipine/Valsartan/Hcthiazid 1 each PO DAILY 02/26/16





[Amlod-Valsa-Hctz -25 mg]  


 


Cholecalciferol (Vitamin D3) 1,000 unit PO DAILY 06/24/16





[Vitamin D]  


 


Cyanocobalamin [Vitamin B12 -] 1,000 mcg PO DAILY 06/24/16


 


Donepezil HCl [Aricept -] 5 mg PO DAILY 07/15/18


 


Imipramine HCl 50 mg PO DAILY 07/15/18


 


Meloxicam 15 mg PO DAILY 07/15/18


 


Methotrexate Sodium [Methotrexate] 20 mg PO WEEKLY 07/15/18


 


Allopurinol [Zyloprim -] 100 mg PO DAILY 02/13/19


 


Atorvastatin Ca [Lipitor] 10 mg PO HS 02/13/19


 


Clopidogrel Bisulfate [Plavix] 75 mg PO DAILY 02/13/19


 


Losartan Potassium 25 mg PO DAILY 02/13/19


 


Metoprolol Tartrate 25 mg PO DAILY 02/13/19


 


Oxybutynin [Oxytrol] 1 each TD DAILY 02/13/19








Active Medications





Acetaminophen (Tylenol -)  650 mg PO Q6H PRN


   PRN Reason: FEVER


Allopurinol (Zyloprim -)  100 mg PO DAILY [facility]


   Last Admin: 02/13/19 10:16 Dose:  100 mg


Amlodipine Besylate (Norvasc -)  5 mg PO DAILY [facility]


   Last Admin: 02/13/19 10:16 Dose:  5 mg


Aspirin (Ecotrin -)  81 mg PO DAILY [facility]


   Last Admin: 02/13/19 10:16 Dose:  81 mg


Cholecalciferol (Vitamin D3 -)  1,000 unit PO DAILY [facility]


   Last Admin: 02/13/19 10:16 Dose:  1,000 unit


Clopidogrel Bisulfate (Plavix -)  75 mg PO DAILY [facility]


   Last Admin: 02/13/19 10:16 Dose:  75 mg


Donepezil HCl (Aricept -)  5 mg PO DAILY [facility]


   Last Admin: 02/13/19 10:18 Dose:  5 mg


Heparin Sodium (Porcine) (Heparin -)  5,000 unit SQ BID [facility]


   Last Admin: 02/13/19 10:16 Dose:  5,000 unit


Sodium Chloride (Normal Saline -)  1,000 mls @ 75 mls/hr IV ASDIR [facility]


   Last Admin: 02/13/19 03:12 Dose:  75 mls/hr


Ceftriaxone Sodium 1 gm/ (Dextrose)  50 mls @ 100 mls/hr IVPB [facility]; Protocol


Imipramine HCl (Tofranil -)  50 mg PO DAILY [facility]


Losartan Potassium (Cozaar -)  25 mg PO DAILY [facility]


   Last Admin: 02/13/19 10:15 Dose:  25 mg


Metoprolol Tartrate (Lopressor -)  25 mg PO DAILY [facility]


   Last Admin: 02/13/19 10:16 Dose:  25 mg


Non-Formulary Medication (Oxybutynin [Oxytrol])  1 each TD DAILY [facility]


Oxybutynin Chloride (Ditropan -)  5 mg PO DAILY [facility]


   Last Admin: 02/13/19 10:16 Dose:  5 mg





 Laboratory Results - last 24 hr











  02/13/19 02/13/19 02/13/19





  01:13 01:13 01:13


 


WBC  9.8  


 


RBC  3.47 L  


 


Hgb  11.9  


 


Hct  34.1  


 


MCV  98.5 H  


 


MCH  34.2 H  


 


MCHC  34.8  


 


RDW  15.4  D  


 


Plt Count  304  


 


MPV  8.4  


 


Absolute Neuts (auto)  6.3  


 


Neutrophils %  63.8  D  


 


Lymphocytes %  24.5  D  


 


Monocytes %  7.4  


 


Eosinophils %  3.1  


 


Basophils %  1.2  


 


Nucleated RBC %  0  


 


ESR   


 


PT with INR   12.30 


 


INR   1.04 


 


PTT (Actin FS)   31.9 


 


VBG pH    7.39


 


POC VBG pCO2    54.0 H


 


POC VBG pO2    13.4 L*


 


Mixed VBG HCO3    32.2 H


 


Sodium   


 


Potassium   


 


Chloride   


 


Carbon Dioxide   


 


Anion Gap   


 


BUN   


 


Creatinine   


 


Creat Clearance w eGFR   


 


Random Glucose   


 


Lactic Acid   


 


Calcium   


 


Phosphorus   


 


Magnesium   


 


Total Bilirubin   


 


AST   


 


ALT   


 


Alkaline Phosphatase   


 


Troponin I   


 


C-Reactive Protein   


 


B-Natriuretic Peptide   


 


Total Protein   


 


Albumin   


 


Urine Color   


 


Urine Appearance   


 


Urine pH   


 


Ur Specific Gravity   


 


Urine Protein   


 


Urine Glucose (UA)   


 


Urine Ketones   


 


Urine Blood   


 


Urine Nitrite   


 


Urine Bilirubin   


 


Urine Urobilinogen   


 


Ur Leukocyte Esterase   


 


Urine WBC (Auto)   


 


Urine RBC (Auto)   


 


Ur Epithelial Cells   


 


Hyaline Casts   


 


Influenza A (Rapid)   


 


Influenza B (Rapid)   














  02/13/19 02/13/19 02/13/19





  01:13 01:13 01:13


 


WBC   


 


RBC   


 


Hgb   


 


Hct   


 


MCV   


 


MCH   


 


MCHC   


 


RDW   


 


Plt Count   


 


MPV   


 


Absolute Neuts (auto)   


 


Neutrophils %   


 


Lymphocytes %   


 


Monocytes %   


 


Eosinophils %   


 


Basophils %   


 


Nucleated RBC %   


 


ESR   


 


PT with INR   


 


INR   


 


PTT (Actin FS)   


 


VBG pH   


 


POC VBG pCO2   


 


POC VBG pO2   


 


Mixed VBG HCO3   


 


Sodium  135 L  


 


Potassium  4.4  


 


Chloride  98  


 


Carbon Dioxide  34 H  


 


Anion Gap  3 L  


 


BUN  16  


 


Creatinine  1.3  


 


Creat Clearance w eGFR  39.41  


 


Random Glucose  97  


 


Lactic Acid   1.3 


 


Calcium  9.4  


 


Phosphorus   


 


Magnesium   


 


Total Bilirubin  0.3  


 


AST  20  


 


ALT  32  


 


Alkaline Phosphatase  75  


 


Troponin I    < 0.02


 


C-Reactive Protein   


 


B-Natriuretic Peptide   


 


Total Protein  7.6  


 


Albumin  3.5  


 


Urine Color   


 


Urine Appearance   


 


Urine pH   


 


Ur Specific Gravity   


 


Urine Protein   


 


Urine Glucose (UA)   


 


Urine Ketones   


 


Urine Blood   


 


Urine Nitrite   


 


Urine Bilirubin   


 


Urine Urobilinogen   


 


Ur Leukocyte Esterase   


 


Urine WBC (Auto)   


 


Urine RBC (Auto)   


 


Ur Epithelial Cells   


 


Hyaline Casts   


 


Influenza A (Rapid)   


 


Influenza B (Rapid)   














  02/13/19 02/13/19 02/13/19





  01:13 01:45 06:00


 


WBC    8.5


 


RBC    3.08 L


 


Hgb    10.4 L


 


Hct    30.3 L


 


MCV    98.4 H


 


MCH    33.7


 


MCHC    34.2


 


RDW    15.3


 


Plt Count    275


 


MPV    8.2


 


Absolute Neuts (auto)    5.2


 


Neutrophils %    60.2


 


Lymphocytes %    27.5


 


Monocytes %    8.9


 


Eosinophils %    2.7


 


Basophils %    0.7


 


Nucleated RBC %    0


 


ESR    40 H


 


PT with INR   


 


INR   


 


PTT (Actin FS)   


 


VBG pH   


 


POC VBG pCO2   


 


POC VBG pO2   


 


Mixed VBG HCO3   


 


Sodium   


 


Potassium   


 


Chloride   


 


Carbon Dioxide   


 


Anion Gap   


 


BUN   


 


Creatinine   


 


Creat Clearance w eGFR   


 


Random Glucose   


 


Lactic Acid   


 


Calcium   


 


Phosphorus   


 


Magnesium   


 


Total Bilirubin   


 


AST   


 


ALT   


 


Alkaline Phosphatase   


 


Troponin I   


 


C-Reactive Protein   


 


B-Natriuretic Peptide   


 


Total Protein   


 


Albumin   


 


Urine Color   Ltyellow 


 


Urine Appearance   Clear 


 


Urine pH   7.0 


 


Ur Specific Gravity   1.012 


 


Urine Protein   Negative 


 


Urine Glucose (UA)   Negative 


 


Urine Ketones   Negative 


 


Urine Blood   1+ H 


 


Urine Nitrite   Negative 


 


Urine Bilirubin   Negative 


 


Urine Urobilinogen   Negative 


 


Ur Leukocyte Esterase   3+ H 


 


Urine WBC (Auto)   57 


 


Urine RBC (Auto)   22 


 


Ur Epithelial Cells   Rare 


 


Hyaline Casts   1 


 


Influenza A (Rapid)  Negative  


 


Influenza B (Rapid)  Negative  














  02/13/19





  06:00


 


WBC 


 


RBC 


 


Hgb 


 


Hct 


 


MCV 


 


MCH 


 


MCHC 


 


RDW 


 


Plt Count 


 


MPV 


 


Absolute Neuts (auto) 


 


Neutrophils % 


 


Lymphocytes % 


 


Monocytes % 


 


Eosinophils % 


 


Basophils % 


 


Nucleated RBC % 


 


ESR 


 


PT with INR 


 


INR 


 


PTT (Actin FS) 


 


VBG pH 


 


POC VBG pCO2 


 


POC VBG pO2 


 


Mixed VBG HCO3 


 


Sodium  139


 


Potassium  3.7


 


Chloride  105


 


Carbon Dioxide  31


 


Anion Gap  4 L


 


BUN  14


 


Creatinine  1.1


 


Creat Clearance w eGFR  47.79


 


Random Glucose  90


 


Lactic Acid 


 


Calcium  8.6


 


Phosphorus  3.0


 


Magnesium  2.3


 


Total Bilirubin  0.3


 


AST  15


 


ALT  23


 


Alkaline Phosphatase  60


 


Troponin I 


 


C-Reactive Protein  9.0 H


 


B-Natriuretic Peptide  170.0


 


Total Protein  6.4


 


Albumin  3.0 L


 


Urine Color 


 


Urine Appearance 


 


Urine pH 


 


Ur Specific Gravity 


 


Urine Protein 


 


Urine Glucose (UA) 


 


Urine Ketones 


 


Urine Blood 


 


Urine Nitrite 


 


Urine Bilirubin 


 


Urine Urobilinogen 


 


Ur Leukocyte Esterase 


 


Urine WBC (Auto) 


 


Urine RBC (Auto) 


 


Ur Epithelial Cells 


 


Hyaline Casts 


 


Influenza A (Rapid) 


 


Influenza B (Rapid) 














ASSESSMENT AND PLAN:


78 yof with PMHx of CAD s/p PCI x2 (7/2018), RA on methotrexate, ?myositis, HTN

, dementia admitted with fevers, cough





-SIRS, suspect from Upper respiratory illness vs less likely, lower 

uncomplicated UTI


-h/o overactive bladder, r/o intermittent urinary retention


-CAD s/p PCI x 2 (7/2018)


-RA on methotrexate, ?myositis


-HTN


-Dementia





Plan:


Influenza neg. Follow up urine/blood cx.


Emperic ceftriaxone. Concerns for distended bladder on admission, bladder scan/

bladder US. 


Continue ASA/plavix/metoprolol.


Gentle hydration. PO as tolerated. d/c IVF in 24 hours.


Hold methotrexate given concerns for infection.


Continue amlodipine/ARB. Hold HCTZ for now


DVTPPX heparin


Dispo in 24 hours if afebrile and no new concerns.


PT carrillo and CM consult for d/c planning. 


Plan discussed with patient in detail and all questions answered.

## 2019-02-14 VITALS — SYSTOLIC BLOOD PRESSURE: 143 MMHG | HEART RATE: 89 BPM | DIASTOLIC BLOOD PRESSURE: 89 MMHG | TEMPERATURE: 98.1 F

## 2019-02-14 LAB
ANION GAP SERPL CALC-SCNC: 7 MMOL/L (ref 8–16)
BASOPHILS # BLD: 0.8 % (ref 0–2)
BUN SERPL-MCNC: 9 MG/DL (ref 7–18)
CALCIUM SERPL-MCNC: 9 MG/DL (ref 8.5–10.1)
CHLORIDE SERPL-SCNC: 103 MMOL/L (ref 98–107)
CO2 SERPL-SCNC: 28 MMOL/L (ref 21–32)
CREAT SERPL-MCNC: 0.9 MG/DL (ref 0.55–1.3)
DEPRECATED RDW RBC AUTO: 15.5 % (ref 11.6–15.6)
EOSINOPHIL # BLD: 3.6 % (ref 0–4.5)
GLUCOSE SERPL-MCNC: 80 MG/DL (ref 74–106)
HCT VFR BLD CALC: 32.4 % (ref 32.4–45.2)
HGB BLD-MCNC: 11 GM/DL (ref 10.7–15.3)
LYMPHOCYTES # BLD: 24.2 % (ref 8–40)
MCH RBC QN AUTO: 33.3 PG (ref 25.7–33.7)
MCHC RBC AUTO-ENTMCNC: 34 G/DL (ref 32–36)
MCV RBC: 98 FL (ref 80–96)
MONOCYTES # BLD AUTO: 9.2 % (ref 3.8–10.2)
NEUTROPHILS # BLD: 62.2 % (ref 42.8–82.8)
PLATELET # BLD AUTO: 287 K/MM3 (ref 134–434)
PMV BLD: 8 FL (ref 7.5–11.1)
POTASSIUM SERPLBLD-SCNC: 3.5 MMOL/L (ref 3.5–5.1)
RBC # BLD AUTO: 3.31 M/MM3 (ref 3.6–5.2)
SODIUM SERPL-SCNC: 138 MMOL/L (ref 136–145)
WBC # BLD AUTO: 10.2 K/MM3 (ref 4–10)

## 2019-02-14 RX ADMIN — CLOPIDOGREL BISULFATE SCH MG: 75 TABLET, FILM COATED ORAL at 10:00

## 2019-02-14 RX ADMIN — VITAMIN D, TAB 1000IU (100/BT) SCH UNIT: 25 TAB at 10:00

## 2019-02-14 RX ADMIN — SODIUM CHLORIDE SCH MLS/HR: 9 INJECTION, SOLUTION INTRAVENOUS at 04:42

## 2019-02-14 RX ADMIN — IMIPRAMINE HYDROCHLORIDE SCH MG: 25 TABLET ORAL at 10:01

## 2019-02-14 RX ADMIN — METOPROLOL TARTRATE SCH MG: 25 TABLET, FILM COATED ORAL at 10:00

## 2019-02-14 RX ADMIN — OXYBUTYNIN CHLORIDE SCH MG: 5 TABLET ORAL at 10:00

## 2019-02-14 RX ADMIN — ALLOPURINOL SCH MG: 100 TABLET ORAL at 10:00

## 2019-02-14 RX ADMIN — ASPIRIN SCH MG: 81 TABLET, COATED ORAL at 10:00

## 2019-02-14 RX ADMIN — LOSARTAN POTASSIUM SCH MG: 25 TABLET, FILM COATED ORAL at 10:00

## 2019-02-14 RX ADMIN — HEPARIN SODIUM SCH UNIT: 5000 INJECTION, SOLUTION INTRAVENOUS; SUBCUTANEOUS at 09:58

## 2019-02-14 RX ADMIN — DONEPEZIL HYDROCHLORIDE SCH MG: 5 TABLET, FILM COATED ORAL at 10:01

## 2019-02-14 RX ADMIN — AMLODIPINE BESYLATE SCH MG: 5 TABLET ORAL at 09:59

## 2019-02-14 NOTE — DS
Physical Exam: 


SUBJECTIVE: Patient seen and examined


no new complaints


pt feels good 








OBJECTIVE:





 Vital Signs











 Period  Temp  Pulse  Resp  BP Sys/Veloz  Pulse Ox


 


 Last 24 Hr  97.4 F-99.4 F  78-91  18-20  123-143/63-89  99








PHYSICAL EXAM





GENERAL: a/o x 3, 


EYES: Pupils equal, round and reactive to light, 


EARS, NOSE, THROAT: moist  mucous membranes


NECK: supple without lymphadenopathy, JVD, or masses.


LUNGS: Breath sounds equal, clear to auscultation bilaterally. No wheezes, and 

no crackles. 


HEART: RRR, no murmurs appreciated


ABDOMEN: Soft, nontender, not distended, normoactive bowel sounds, no guarding, 

no rebound, no masses.


LOWER EXTREMITIES: 2+ pulses,  No peripheral edema. 


SKIN: dry





LABS


 Laboratory Results - last 24 hr











  02/13/19 02/14/19 02/14/19





  10:30 08:31 08:31


 


WBC   10.2 H 


 


RBC   3.31 L 


 


Hgb   11.0 


 


Hct   32.4 


 


MCV   98.0 H 


 


MCH   33.3 


 


MCHC   34.0 


 


RDW   15.5 


 


Plt Count   287 


 


MPV   8.0 


 


Absolute Neuts (auto)   6.3 


 


Neutrophils %   62.2 


 


Lymphocytes %   24.2 


 


Monocytes %   9.2 


 


Eosinophils %   3.6 


 


Basophils %   0.8 


 


Nucleated RBC %   0 


 


Sodium    138


 


Potassium    3.5


 


Chloride    103


 


Carbon Dioxide    28


 


Anion Gap    7 L


 


BUN    9


 


Creatinine    0.9


 


Creat Clearance w eGFR    > 60


 


Random Glucose    80


 


Calcium    9.0


 


Influenza A (Rapid)  Negative  


 


Influenza B (Rapid)  Negative  











HOSPITAL COURSE:Patient is an 79 yo F with a PMHx of HTN, a "muscle disease", 

MI s/p 2 stents(7/18), presented because of a cough with white sputum 

production and intermittent fevers over the last week. She also complains of 

chills and a stuffy nose that started the same time.  She denies sick contacts 

and recent travel. She lives with her daughter. She denies urinary symptoms 

except for her chronic urinary incontinence. Daughter says patient also has not 

been drinking water and thinks her mother is dehydrated. Patient denies nausea, 

vomiting, sob, headaches, chest pain, numbness, tingling, diarrhea, decrease in 

appetite. 





Pt foud to have uti. Pt treated on ceftriaxone IV. Pt has no episode of fever 

in hospital. Pt DC home with VNS on keflex 500mg bid for 5 more days. 





advise 








Follow up with your PCP Dr. gunn with in one week. 


Drink plenty of liquid. 


Continue taking all of your heart and hypertension medicines as you were taking 

it before. 


Start taking your methotrexate from next week. 


Take all of your medications as you were taking before. 


Take keflex antibiotic 500mg twice a day for 5 more days 


If you develop pain abdomen, nausea, vomiting, fever, chills or any new 

symptoms call your doctor or go to hospital 





Your urine culture final reports are currently pending and you will be notified 

if you need to change the antibiotics. You can also have your regular doctor 

follow up on results in 24-48 hours.


In the interim if you notice any fevers, chills, belly pain, urinary symptoms 

or new concerns, please come to ED 











Date of Admission:02/13/19





Date of Discharge: 02/14/19











Minutes to complete discharge: 45





Discharge Summary


Reason For Visit: URINARY TRACT INFECTION/SEPSIS


Condition: Guarded





- Instructions


Diet, Activity, Other Instructions: 


Follow up with your PCP Dr. gunn with in one week. 


Drink plenty of liquid. 


Continue taking all of your heart and hypertension medicines as you were taking 

it before. 


Start taking your methotrexate from next week. 


Take all of your medications as you were taking before. 


Take keflex antibiotic 500mg twice a day for 5 more days 


If you develop pain abdomen, nausea, vomiting, fever, chills or any new 

symptoms call your doctor or go to hospital 





Your urine culture final reports are currently pending and you will be notified 

if you need to change the antibiotics. You can also have your regular doctor 

follow up on results in 24-48 hours.


In the interim if you notice any fevers, chills, belly pain, urinary symptoms 

or new concerns, please come to ED 








Referrals: 


Kuldip Gunn MD [Primary Care Provider] - 1 Week


Disposition: VNS/HOME HEALTH CARE





- Home Medications


Comprehensive Discharge Medication List: 


Ambulatory Orders





Aspirin Coated [Ecotrin -] 81 mg PO DAILY 11/25/12 


Amlodipine/Valsartan/Hcthiazid [Amlod-Valsa-Hctz -25 mg] 1 each PO DAILY 

02/26/16 


Cholecalciferol (Vitamin D3) [Vitamin D3] 1,000 unit PO DAILY 06/24/16 


Cyanocobalamin [Vitamin B12 -] 1,000 mcg PO DAILY 06/24/16 


Donepezil HCl [Aricept -] 5 mg PO DAILY 07/15/18 


Imipramine HCl 50 mg PO DAILY 07/15/18 


Meloxicam 15 mg PO DAILY 07/15/18 


Methotrexate Sodium [Methotrexate] 20 mg PO WEEKLY 07/15/18 


Allopurinol [Zyloprim -] 100 mg PO DAILY 02/13/19 


Atorvastatin Ca [Lipitor] 10 mg PO HS 02/13/19 


Clopidogrel Bisulfate [Plavix] 75 mg PO DAILY 02/13/19 


Losartan Potassium 25 mg PO DAILY 02/13/19 


Metoprolol Tartrate 25 mg PO DAILY 02/13/19 


Oxybutynin [Oxytrol] 1 each TD DAILY 02/13/19 


Cephalexin [Keflex] 500 mg PO BID #10 capsule 02/14/19 








This patient is new to me today: No


Emergency Visit: Yes


ED Registration Date: 02/13/19


Care time: The patient presented to the Emergency Department on the above date 

and was hospitalized for further evaluation of their emergent condition.


Critical Care patient: No





- Discharge Referral


Referred to [facility] No

## 2019-02-14 NOTE — PN
Teaching Attending Note


Name of Resident: Kit Berry





ATTENDING PHYSICIAN STATEMENT





I saw and evaluated the patient.


I reviewed the resident's note and discussed the case with the resident.


I agree with the resident's findings and plan as documented with exceptions 

below.








SUBJECTIVE:


patient seen and examined, cough better, no new complaints. Tolerating diet well

, no new fevers





OBJECTIVE:


 Vital Signs











 Period  Temp  Pulse  Resp  BP Sys/Veloz  Pulse Ox


 


 Last 24 Hr  97.4 F-99.4 F  78-91  18-20  123-143/63-89  99








 Intake & Output











 02/11/19 02/12/19 02/13/19 02/14/19





 23:59 23:59 23:59 23:59


 


Intake Total   2550 750


 


Balance   2550 750


 


Weight   150 lb 2.157 oz 








General: sitting in bed in no acute distress


Chest: CTAB, no rales or wheezing


Abdomen:Soft, NT no suprapubic or CVA tenderness


Extremities: no edema





 Home Medications











 Medication  Instructions  Recorded


 


Aspirin Coated [Ecotrin -] 81 mg PO DAILY 11/25/12


 


Amlodipine/Valsartan/Hcthiazid 1 each PO DAILY 02/26/16





[Amlod-Valsa-Hctz -25 mg]  


 


Cholecalciferol (Vitamin D3) 1,000 unit PO DAILY 06/24/16





[Vitamin D3]  


 


Cyanocobalamin [Vitamin B12 -] 1,000 mcg PO DAILY 06/24/16


 


Donepezil HCl [Aricept -] 5 mg PO DAILY 07/15/18


 


Imipramine HCl 50 mg PO DAILY 07/15/18


 


Meloxicam 15 mg PO DAILY 07/15/18


 


Methotrexate Sodium [Methotrexate] 20 mg PO WEEKLY 07/15/18


 


Allopurinol [Zyloprim -] 100 mg PO DAILY 02/13/19


 


Atorvastatin Ca [Lipitor] 10 mg PO HS 02/13/19


 


Clopidogrel Bisulfate [Plavix] 75 mg PO DAILY 02/13/19


 


Losartan Potassium 25 mg PO DAILY 02/13/19


 


Metoprolol Tartrate 25 mg PO DAILY 02/13/19


 


Oxybutynin [Oxytrol] 1 each TD DAILY 02/13/19


 


Cephalexin [Keflex] 500 mg PO BID #10 capsule 02/14/19








Active Medications





Acetaminophen (Tylenol -)  650 mg PO Q6H PRN


   PRN Reason: FEVER


Allopurinol (Zyloprim -)  100 mg PO DAILY [facility]


   Last Admin: 02/14/19 10:00 Dose:  100 mg


Amlodipine Besylate (Norvasc -)  5 mg PO DAILY [facility]


   Last Admin: 02/14/19 09:59 Dose:  5 mg


Aspirin (Ecotrin -)  81 mg PO DAILY [facility]


   Last Admin: 02/14/19 10:00 Dose:  81 mg


Cephalexin HCl (Keflex -)  500 mg PO ONCE ONE


   Stop: 02/14/19 11:31


Cholecalciferol (Vitamin D3 -)  1,000 unit PO DAILY [facility]


   Last Admin: 02/14/19 10:00 Dose:  1,000 unit


Clopidogrel Bisulfate (Plavix -)  75 mg PO DAILY [facility]


   Last Admin: 02/14/19 10:00 Dose:  75 mg


Donepezil HCl (Aricept -)  5 mg PO DAILY [facility]


   Last Admin: 02/14/19 10:01 Dose:  5 mg


Heparin Sodium (Porcine) (Heparin -)  5,000 unit SQ BID [facility]


   Last Admin: 02/14/19 09:58 Dose:  5,000 unit


Sodium Chloride (Normal Saline -)  1,000 mls @ 75 mls/hr IV ASDIR [facility]


   Last Admin: 02/14/19 04:42 Dose:  75 mls/hr


Ceftriaxone Sodium 1 gm/ (Dextrose)  50 mls @ 100 mls/hr IVPB HS [facility]; Protocol


   Last Admin: 02/13/19 21:42 Dose:  100 mls/hr


Imipramine HCl (Tofranil -)  50 mg PO DAILY [facility]


   Last Admin: 02/14/19 10:01 Dose:  50 mg


Losartan Potassium (Cozaar -)  25 mg PO DAILY [facility]


   Last Admin: 02/14/19 10:00 Dose:  25 mg


Metoprolol Tartrate (Lopressor -)  25 mg PO DAILY [facility]


   Last Admin: 02/14/19 10:00 Dose:  25 mg


Oxybutynin Chloride (Ditropan -)  5 mg PO DAILY [facility]


   Last Admin: 02/14/19 10:00 Dose:  5 mg





 Laboratory Results - last 24 hr











  02/13/19 02/14/19 02/14/19





  10:30 08:31 08:31


 


WBC   10.2 H 


 


RBC   3.31 L 


 


Hgb   11.0 


 


Hct   32.4 


 


MCV   98.0 H 


 


MCH   33.3 


 


MCHC   34.0 


 


RDW   15.5 


 


Plt Count   287 


 


MPV   8.0 


 


Absolute Neuts (auto)   6.3 


 


Neutrophils %   62.2 


 


Lymphocytes %   24.2 


 


Monocytes %   9.2 


 


Eosinophils %   3.6 


 


Basophils %   0.8 


 


Nucleated RBC %   0 


 


Sodium    138


 


Potassium    3.5


 


Chloride    103


 


Carbon Dioxide    28


 


Anion Gap    7 L


 


BUN    9


 


Creatinine    0.9


 


Creat Clearance w eGFR    > 60


 


Random Glucose    80


 


Calcium    9.0


 


Influenza A (Rapid)  Negative  


 


Influenza B (Rapid)  Negative  











 Microbiology





02/13/19 01:45   Urine - Urine Clean Catch   Urine Culture - Preliminary


02/13/19 01:19   Blood - Peripheral Venous   Blood Culture - Preliminary


                            NO GROWTH OBTAINED AFTER 24 HOURS, INCUBATION TO 

CONTINUE


                            FOR 4 DAYS.


02/13/19 01:19   Blood - Peripheral Venous   Blood Culture - Preliminary


                            NO GROWTH OBTAINED AFTER 24 HOURS, INCUBATION TO 

CONTINUE


                            FOR 4 DAYS.








ASSESSMENT AND PLAN:


78 yof with PMHx of CAD s/p PCI x2 (7/2018), RA on methotrexate, ?myositis, HTN

, dementia admitted with fevers, cough





-SIRS, suspect from Upper respiratory illness vs less likely, lower 

uncomplicated UTI


-h/o overactive bladder, r/o intermittent urinary retention


-CAD s/p PCI x 2 (7/2018)


-RA on methotrexate, ?myositis


-HTN


-Dementia





Plan:


Influenza neg.blood cx neg, afebrile, urine cx pending (minute colonies) 

however patient with no abdominal or urinary symptoms on presentation. 


Clear clinical symptoms of cough, URI like illness and sick contact likely 

contributory to her fevers on presentation. 


Short course of keflex. 


Continue ASA/plavix/metoprolol.


Tolerating Po well. 


resume home meds


DVTPPX heparin


Dispo PT eval noted, 


d/c home with VNS/PT with outpatient PCP follow up. 


Plan discussed with patient in detail, all questions answered.

## 2019-07-09 ENCOUNTER — HOSPITAL ENCOUNTER (INPATIENT)
Dept: HOSPITAL 74 - JER | Age: 79
LOS: 2 days | Discharge: HOME HEALTH | DRG: 684 | End: 2019-07-11
Attending: INTERNAL MEDICINE | Admitting: INTERNAL MEDICINE
Payer: COMMERCIAL

## 2019-07-09 VITALS — BODY MASS INDEX: 24.7 KG/M2

## 2019-07-09 DIAGNOSIS — M10.9: ICD-10-CM

## 2019-07-09 DIAGNOSIS — S09.90XA: ICD-10-CM
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
## 2019-07-09 DIAGNOSIS — I25.2: ICD-10-CM

## 2019-07-09 DIAGNOSIS — Z95.5: ICD-10-CM
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
## 2019-07-09 DIAGNOSIS — N28.1: ICD-10-CM

## 2019-07-09 DIAGNOSIS — F03.90: ICD-10-CM
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
## 2019-07-09 DIAGNOSIS — N32.81: ICD-10-CM

## 2019-07-09 DIAGNOSIS — M54.2: ICD-10-CM

## 2019-07-09 DIAGNOSIS — R53.1: ICD-10-CM
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
## 2019-07-09 DIAGNOSIS — I10: ICD-10-CM

## 2019-07-09 DIAGNOSIS — D64.9: ICD-10-CM

## 2019-07-09 DIAGNOSIS — M06.9: ICD-10-CM

## 2019-07-09 DIAGNOSIS — N17.9: Primary | ICD-10-CM

## 2019-07-09 LAB
ALBUMIN SERPL-MCNC: 3.7 G/DL (ref 3.4–5)
ALP SERPL-CCNC: 76 U/L (ref 45–117)
ALT SERPL-CCNC: 58 U/L (ref 13–61)
ANION GAP SERPL CALC-SCNC: 5 MMOL/L (ref 8–16)
APPEARANCE UR: CLEAR
AST SERPL-CCNC: 41 U/L (ref 15–37)
BACTERIA # UR AUTO: 7.8 /HPF
BASOPHILS # BLD: 0.8 % (ref 0–2)
BILIRUB SERPL-MCNC: 0.5 MG/DL (ref 0.2–1)
BILIRUB UR STRIP.AUTO-MCNC: NEGATIVE MG/DL
BUN SERPL-MCNC: 24.5 MG/DL (ref 7–18)
CALCIUM SERPL-MCNC: 9.2 MG/DL (ref 8.5–10.1)
CASTS URNS QL MICRO: 4 /LPF (ref 0–8)
CHLORIDE SERPL-SCNC: 103 MMOL/L (ref 98–107)
CO2 SERPL-SCNC: 32 MMOL/L (ref 21–32)
COLOR UR: YELLOW
CREAT SERPL-MCNC: 1.7 MG/DL (ref 0.55–1.3)
DEPRECATED RDW RBC AUTO: 16.2 % (ref 11.6–15.6)
EOSINOPHIL # BLD: 3.1 % (ref 0–4.5)
EPITH CASTS URNS QL MICRO: 1.2 /HPF
GLUCOSE SERPL-MCNC: 102 MG/DL (ref 74–106)
HCT VFR BLD CALC: 32.2 % (ref 32.4–45.2)
HGB BLD-MCNC: 10.7 GM/DL (ref 10.7–15.3)
KETONES UR QL STRIP: NEGATIVE
LEUKOCYTE ESTERASE UR QL STRIP.AUTO: (no result)
LYMPHOCYTES # BLD: 36.1 % (ref 8–40)
MCH RBC QN AUTO: 32.5 PG (ref 25.7–33.7)
MCHC RBC AUTO-ENTMCNC: 33.3 G/DL (ref 32–36)
MCV RBC: 97.6 FL (ref 80–96)
MONOCYTES # BLD AUTO: 4 % (ref 3.8–10.2)
NEUTROPHILS # BLD: 56 % (ref 42.8–82.8)
NITRITE UR QL STRIP: NEGATIVE
PH UR: 5 [PH] (ref 5–8)
PLATELET # BLD AUTO: 257 K/MM3 (ref 134–434)
PMV BLD: 7.8 FL (ref 7.5–11.1)
POTASSIUM SERPLBLD-SCNC: 4.2 MMOL/L (ref 3.5–5.1)
PROT SERPL-MCNC: 6.7 G/DL (ref 6.4–8.2)
PROT UR QL STRIP: NEGATIVE
PROT UR QL STRIP: NEGATIVE
RBC # BLD AUTO: 2 /HPF (ref 0–4)
RBC # BLD AUTO: 3.3 M/MM3 (ref 3.6–5.2)
SODIUM SERPL-SCNC: 140 MMOL/L (ref 136–145)
SP GR UR: 1.01 (ref 1.01–1.03)
UROBILINOGEN UR STRIP-MCNC: 0.2 MG/DL (ref 0.2–1)
WBC # BLD AUTO: 3.8 K/MM3 (ref 4–10)
WBC # UR AUTO: 3 /HPF (ref 0–5)

## 2019-07-09 NOTE — PDOC
History of Present Illness





- General


Chief Complaint: Injury


Stated Complaint: FALL/NECK PAIN


Time Seen by Provider: 07/09/19 17:59





- History of Present Illness


Initial Comments: 





07/09/19 22:18


The patient is an 79 year old female with a history of HTN, arthritis who 

presents for evaluation following a fall.  The patient is accompanied by family 

who assists in providing the history.  They state that the patient was sitting 

in a recliner chair when it lifted to quickly and the patient fell out striking 

her head on a dresser 2 days ago.  The patient has been experiencing worsening 

neck pain over the past 2 days prompting her presentation to the ED for further 

evaluation.  They also note some generalized weakness but otherwise denies 

headache, fevers, chills, SOB, chest pain, nausea, vomiting, abdominal pain, 

numbness, tingling, focal weakness, or changes with urination or bowel 

movements.





Past History





- Past Medical History


Allergies/Adverse Reactions: 


 Allergies











Allergy/AdvReac Type Severity Reaction Status Date / Time


 


No Known Drug Allergies Allergy Intermediate  Verified 07/09/19 18:00











Home Medications: 


Ambulatory Orders





Aspirin Coated [Ecotrin -] 81 mg PO DAILY 11/25/12 


Amlodipine/Valsartan/Hcthiazid [Amlod-Valsa-Hctz -25 mg] 1 each PO DAILY 

02/26/16 


Cholecalciferol (Vitamin D3) [Vitamin D3] 1,000 unit PO DAILY 06/24/16 


Cyanocobalamin [Vitamin B12 -] 1,000 mcg PO DAILY 06/24/16 


Donepezil HCl [Aricept -] 5 mg PO DAILY 07/15/18 


Imipramine HCl 50 mg PO DAILY 07/15/18 


Meloxicam 15 mg PO DAILY 07/15/18 


Methotrexate Sodium [Methotrexate] 20 mg PO WEEKLY 07/15/18 


Allopurinol [Zyloprim -] 100 mg PO DAILY 02/13/19 


Atorvastatin Ca [Lipitor] 10 mg PO HS 02/13/19 


Clopidogrel Bisulfate [Plavix] 75 mg PO DAILY 02/13/19 


Losartan Potassium 25 mg PO DAILY 02/13/19 


Metoprolol Tartrate 25 mg PO DAILY 02/13/19 


Oxybutynin [Oxytrol] 1 each TD DAILY 02/13/19 


Cephalexin [Keflex] 500 mg PO BID #10 capsule 02/14/19 








Anemia: No


Asthma: No


Cancer: No


Cardiac Disorders: No


CVA: No


COPD: No


CHF: No


Dementia: No


Diabetes: No


GI Disorders: No


 Disorders: No


HTN: Yes


Hypercholesterolemia: No


Liver Disease: No


Seizures: No


Thyroid Disease: No





- Immunization History


Immunization Up to Date: Yes (FLU)





- Suicide/Smoking/Psychosocial Hx


Smoking Status: No


Smoking History: Never smoked


Have you smoked in the past 12 months: No


Number of Cigarettes Smoked Daily: 0


Information on smoking cessation initiated: No


Hx Alcohol Use: No


Drug/Substance Use Hx: No


Substance Use Type: None





**Review of Systems





- Review of Systems


Comments:: 





07/09/19 22:20


Constitutional: Fatigue No fevers, chills,malaise


HEENT: Neck pain.  No Rhinorrhea, nasal congestion, visual changes


Cardiovascular: No chest pain, syncope, palpitations, lightheadedness


Respiratory: No Cough, SOB, Hemoptysis,


Gastrointestinal: No Abdominal pain, Nausea, Vomiting, Constipation, Diarrhea, 

Melena


Genitourinary: No Dysuria, Frequency, Urgency, Hesitancy, Hematuria, Flank pain


Musculoskeletal: No Myalgia, arthralgia


Skin: No rashes, itching, bruising, pallor


Neurologic: No Headache, Dizziness, Numbness, Weakness, or Tingling


Psychiatric: No Hallucinations. No SI or HI








*Physical Exam





- Vital Signs


 Last Vital Signs











Temp Pulse Resp BP Pulse Ox


 


 98.3 F   99 H  16   116/65   100 


 


 07/09/19 18:01  07/09/19 18:01  07/09/19 18:01  07/09/19 18:01  07/09/19 18:01














- Physical Exam


Comments: 





07/09/19 22:21


General Appearance: Nourished. No Apparent Distress


HEENT: EOMI, REI. No Pharyngeal Erythema, Tonsillar Exudate, Tonsillar Erythema


Neck: No Cervical Lymphadenopathy or C-spine Tenderness.  Full ROM.  


Respiratory/Chest: Lungs Clear, Normal Breath Sounds. No Crackles, Rales, 

Rhonchi, Wheezing


Cardiovascular: Regular Rhythm, Regular Rate. No Murmur, Gallops, Rubs


Gastrointestinal/Abdominal: Normal Bowel Sounds, Soft. No Guarding, Rebound, 

Tenderness


Musculoskeletal: No CVA Tenderness


Extremity: Normal Capillary Refill


Integumentary: Normal Color, Dry, Warm


Neurologic: CNs II-XII NML intact, Fully Oriented, Alert, Normal Mood/Affect, 

Normal Response, Motor Strength 5/5.





**Heart Score/ECG Review


  ** #1


ECG reviewed & interpreted by me at: 23:48





07/09/19 23:48


HR 77





QRS 72


QTc 441


Normal Sinus Rhythm


No Acute ST Changes





ED Treatment Course





- LABORATORY


CBC & Chemistry Diagram: 


 07/09/19 21:30





 07/09/19 18:06





Medical Decision Making





- Medical Decision Making





07/09/19 22:21


The patient is an 79 year old female with a history of HTN, arthritis who 

presents for evaluation following a fall. Differential includes but is not 

limited to: Intracranial process, fracture, metabolic derangement.  Given the 

patient's history and physical exam, we will obtain a cbc, cmp, ua, ekg, head ct

, cervical ct to evaluate further.  We will continue to monitor and reassess 

while here in the ED.





07/09/19 23:58


CBC is unremarkable.  CMP demonstrates an elevated creatinine to 1.7.  We will 

treat the patient with iv fluids.  Head CT, and Cervical CT were unremarkable 

as read by our radiologist.  We discussed with the patient's family regarding 

admission vs. outpatient management and the patient's family is more 

comfortable with admission for further management and monitoring of the patient'

s BANDAR.  We discussed the case with the admitting team who accepted the patient 

for admission.





*DC/Admit/Observation/Transfer


Diagnosis at time of Disposition: 


 Neck pain, BANDAR (acute kidney injury), Weakness








- Discharge Dispostion


Condition at time of disposition: Stable


Decision to Admit order: Yes





- Referrals


Referrals: 


Kuldip Gunn MD [Primary Care Provider] - 





- Patient Instructions





- Post Discharge Activity

## 2019-07-09 NOTE — PDOC
Documentation entered by Naty Riley SCRIBE, acting as scribe for Otilia Bright DO.








Otilia Bright DO:  This documentation has been prepared by the 

Rosemary coffman Brenda, SCRIBE, under my direction and personally reviewed by me 

in its entirety.  I confirm that the documentation accurately reflects all work

, treatment, procedures, and medical decision making performed by me.  





Attending Attestation





- Resident


Resident Name: Lamont Hansen





- ED Attending Attestation


I have performed the following: I have examined & evaluated the patient, The 

case was reviewed & discussed with the resident, I agree w/resident's findings 

& plan, Exceptions are as noted





- HPI


HPI: 





07/09/19 23:06


The patient is an 79 year old female, with a significant PMH of HTN, arthritis, 

mild dementia, and RA, who presents to the emergency department BIBA s/p  fall. 

The patient reports experiencing a fall a few days ago and began to feel neck 

pain. Daughter, on the bedside, endorses weakness. 





The patient denies chest pain, shortness of breath, headache and dizziness. 

Denies fever, chills, nausea, vomiting, diarrhea and constipation.


 





Allergies: NKA


Past surgical history: Not reported. 


Social history: Denies. 


PCP: Dr. JANET Gunn 








- Physicial Exam


PE: 





07/09/19 21:03


Agree with resident's exam. 





- Medical Decision Making





07/09/19 23:15


79-year-old female status post mechanical fall with neck pain


CT scans of the head and neck are negative for acute traumatic injury


There is a mild elevation of patient's creatinine, plan for IV fluids and 

discussion with family regarding overnight admission versus discharge home and 

prompt outpatient follow-up for repeat labs in 48 hours

## 2019-07-09 NOTE — PDOC
Rapid Medical Evaluation


Time Seen by Provider: 07/09/19 17:59


Medical Evaluation: 


 Allergies











Allergy/AdvReac Type Severity Reaction Status Date / Time


 


No Known Drug Allergies Allergy Intermediate  Verified 07/09/19 18:00











07/09/19 18:01


Pt c/o: fell backwards onto carpet while trying to sit in recliner 2 days ago. 

no loc. no w c/o neck pain 


Pt on brief exam: no midline cer tenderness, vss, 


Pt ordered for: head and neck ct, cbc, comp


Pt to proceed to the ED





**Discharge Disposition





- Diagnosis


 Neck pain, BANDAR (acute kidney injury), Weakness








- Discharge Dispostion


Disposition: HOME


Condition at time of disposition: Stable





- Referrals





- Patient Instructions





- Post Discharge Activity

## 2019-07-10 LAB
ALBUMIN SERPL-MCNC: 3.5 G/DL (ref 3.4–5)
ALP SERPL-CCNC: 78 U/L (ref 45–117)
ALT SERPL-CCNC: 54 U/L (ref 13–61)
ANION GAP SERPL CALC-SCNC: 6 MMOL/L (ref 8–16)
AST SERPL-CCNC: 37 U/L (ref 15–37)
BASOPHILS # BLD: 0.4 % (ref 0–2)
BILIRUB SERPL-MCNC: 0.6 MG/DL (ref 0.2–1)
BUN SERPL-MCNC: 18.2 MG/DL (ref 7–18)
CALCIUM SERPL-MCNC: 9 MG/DL (ref 8.5–10.1)
CHLORIDE SERPL-SCNC: 108 MMOL/L (ref 98–107)
CO2 SERPL-SCNC: 31 MMOL/L (ref 21–32)
CREAT SERPL-MCNC: 1.2 MG/DL (ref 0.55–1.3)
DEPRECATED RDW RBC AUTO: 16.3 % (ref 11.6–15.6)
EOSINOPHIL # BLD: 4.1 % (ref 0–4.5)
GLUCOSE SERPL-MCNC: 89 MG/DL (ref 74–106)
HCT VFR BLD CALC: 31.4 % (ref 32.4–45.2)
HGB BLD-MCNC: 10.6 GM/DL (ref 10.7–15.3)
LYMPHOCYTES # BLD: 31.1 % (ref 8–40)
MAGNESIUM SERPL-MCNC: 2.3 MG/DL (ref 1.8–2.4)
MCH RBC QN AUTO: 32.4 PG (ref 25.7–33.7)
MCHC RBC AUTO-ENTMCNC: 33.7 G/DL (ref 32–36)
MCV RBC: 96.2 FL (ref 80–96)
MONOCYTES # BLD AUTO: 2.9 % (ref 3.8–10.2)
NEUTROPHILS # BLD: 61.5 % (ref 42.8–82.8)
PHOSPHATE SERPL-MCNC: 3.3 MG/DL (ref 2.5–4.9)
PLATELET # BLD AUTO: 259 K/MM3 (ref 134–434)
PMV BLD: 8.2 FL (ref 7.5–11.1)
POTASSIUM SERPLBLD-SCNC: 3.5 MMOL/L (ref 3.5–5.1)
PROT SERPL-MCNC: 6.5 G/DL (ref 6.4–8.2)
RBC # BLD AUTO: 3.26 M/MM3 (ref 3.6–5.2)
SODIUM SERPL-SCNC: 145 MMOL/L (ref 136–145)
URATE SERPL-SCNC: 5 MG/DL (ref 2.6–7.2)
WBC # BLD AUTO: 4.6 K/MM3 (ref 4–10)

## 2019-07-10 RX ADMIN — HEPARIN SODIUM SCH: 5000 INJECTION, SOLUTION INTRAVENOUS; SUBCUTANEOUS at 15:16

## 2019-07-10 RX ADMIN — METOPROLOL TARTRATE SCH MG: 25 TABLET, FILM COATED ORAL at 09:02

## 2019-07-10 RX ADMIN — Medication SCH EACH: at 22:47

## 2019-07-10 RX ADMIN — HEPARIN SODIUM SCH UNIT: 5000 INJECTION, SOLUTION INTRAVENOUS; SUBCUTANEOUS at 22:48

## 2019-07-10 RX ADMIN — ASPIRIN SCH MG: 81 TABLET, COATED ORAL at 09:03

## 2019-07-10 RX ADMIN — CLOPIDOGREL BISULFATE SCH MG: 75 TABLET, FILM COATED ORAL at 09:02

## 2019-07-10 RX ADMIN — HEPARIN SODIUM SCH UNIT: 5000 INJECTION, SOLUTION INTRAVENOUS; SUBCUTANEOUS at 06:13

## 2019-07-10 NOTE — EKG
Test Reason : 

Blood Pressure : ***/*** mmHG

Vent. Rate : 077 BPM     Atrial Rate : 077 BPM

   P-R Int : 180 ms          QRS Dur : 072 ms

    QT Int : 390 ms       P-R-T Axes : 056 -03 049 degrees

   QTc Int : 441 ms

 

NORMAL SINUS RHYTHM

NORMAL ECG

WHEN COMPARED WITH ECG OF 13-FEB-2019 02:07,

NO SIGNIFICANT CHANGE WAS FOUND

Confirmed by VANDANA ARGUETA MD ([id number]) on 7/10/2019 8:32:47 AM

 

Referred By:             Confirmed By:VANDANA ARGUETA MD

## 2019-07-10 NOTE — CONSULT
Consult


Consult Specialty:: Nephrology


Reason for Consultation:: BANDAR





- History of Present Illness


Chief Complaint: s/p fall


History of Present Illness: 





Pt is an 79 year old female with pmhx of htn and arthritis who presents to the 

ER after a fall. She was found to have BANDAR and I was called to evaluate her. 

She denies shortness of breath. She denies palpitations. She denies fevers or 

chills. She denies dysuria or hematuria. She denies history of ckd. She takes 

advil for pain regularly. She feels that the weakness is better today. Her 

renal function improved with fluids. 





- History Source


History Provided By: Patient, Medical Record





- Past Medical History


CNS: Yes: Dementia (, mild)


Cardio/Vascular: Yes: HTN


Renal/: Yes: Other (overactive bladder)


...Pregnant: No


Rheumatology: Yes: Rheumatoid Arthritis, Other (myositis)





- Past Surgical History


Past Surgical History: Yes: Carotid Endarterectomy (left side)





- Alcohol/Substance Use


Hx Alcohol Use: No





- Smoking History


Smoking history: Never smoked


Have you smoked in the past 12 months: No


Aproximately how many cigarettes per day: 0





- Social History


Occupation: former home health aide





Home Medications





- Allergies


Allergies/Adverse Reactions: 


 Allergies











Allergy/AdvReac Type Severity Reaction Status Date / Time


 


No Known Drug Allergies Allergy Intermediate  Verified 07/09/19 18:00














- Home Medications


Home Medications: 


Ambulatory Orders





Aspirin Coated [Ecotrin -] 81 mg PO DAILY 11/25/12 


Amlodipine/Valsartan/Hcthiazid [Amlod-Valsa-Hctz -25 mg] 10 mg PO DAILY 02 /26/16 


Cholecalciferol (Vitamin D3) [Vitamin D3] 1,000 unit PO DAILY 06/24/16 


Cyanocobalamin [Vitamin B12 -] 1,000 mcg PO DAILY 06/24/16 


Donepezil HCl [Aricept -] 5 mg PO DAILY 07/15/18 


Imipramine HCl 50 mg PO HS 07/15/18 


Meloxicam 15 mg PO DAILY 07/15/18 


Allopurinol [Zyloprim -] 100 mg PO DAILY 02/13/19 


Atorvastatin Ca [Lipitor] 40 mg PO HS 02/13/19 


Clopidogrel Bisulfate [Plavix] 75 mg PO DAILY 02/13/19 


Losartan Potassium 50 mg PO DAILY 02/13/19 


Metoprolol Tartrate 50 mg PO DAILY 02/13/19 


Oxybutynin [Oxytrol] 5 each PO DAILY 02/13/19 


Cyclosporine [Restasis] 1 each OP BID 07/10/19 











Family Disease History





- Family Disease History


Family History: Denies





Review of Systems





- Review of Systems


Constitutional: reports: Malaise.  denies: Chills, Fever


Eyes: reports: No Symptoms


HENT: reports: No Symptoms


Cardiovascular: reports: No Symptoms


Respiratory: reports: No Symptoms


Gastrointestinal: reports: No Symptoms


Genitourinary: reports: No Symptoms


Musculoskeletal: reports: No Symptoms


Integumentary: reports: No Symptoms


Neurological: reports: No Symptoms


Endocrine: reports: No Symptoms


Hematology/Lymphatic: reports: No Symptoms


Psychiatric: reports: No Symptoms





Physical Exam


Vital Signs: 


 Vital Signs











Temperature  97.9 F   07/10/19 07:54


 


Pulse Rate  79   07/10/19 07:54


 


Respiratory Rate  18   07/10/19 07:55


 


Blood Pressure  124/81   07/10/19 07:54


 


O2 Sat by Pulse Oximetry (%)  95   07/10/19 07:55











Constitutional: Yes: Calm


Eyes: Yes: Conjunctiva Clear


HENT: Yes: Atraumatic


Neck: Yes: Supple


Cardiovascular: Yes: S1, S2


Respiratory: Yes: CTA Bilaterally


Gastrointestinal: Yes: Soft


Renal/: Yes: WNL


Musculoskeletal: Yes: WNL


Extremities: Yes: WNL


Edema: No


Integumentary: Yes: WNL


Neurological: Yes: Oriented


Psychiatric: Yes: Oriented


Labs: 


 CBC, BMP





 07/10/19 06:27 





 07/10/19 06:27 





 Laboratory Tests











  07/09/19 07/09/19 07/09/19





  18:06 21:30 23:00


 


WBC   3.8 L 


 


Hgb   


 


Creatinine  1.7 H  


 


Creatine Kinase   


 


Urine Protein    Negative


 


Urine Blood    Negative


 


Ur Random Sodium   














  07/10/19 07/10/19 07/10/19





  02:10 06:27 06:27


 


WBC   4.6 


 


Hgb   10.6 L 


 


Creatinine    1.2


 


Creatine Kinase   


 


Urine Protein   


 


Urine Blood   


 


Ur Random Sodium  19 L  














  07/10/19





  06:27


 


WBC 


 


Hgb 


 


Creatinine 


 


Creatine Kinase  109


 


Urine Protein 


 


Urine Blood 


 


Ur Random Sodium 














Imaging





- Results


Ultrasound: Report Reviewed





Problem List





- Problems


(1) BANDAR (acute kidney injury)


Code(s): N17.9 - ACUTE KIDNEY FAILURE, UNSPECIFIED   





(2) HTN (hypertension)


Code(s): I10 - ESSENTIAL (PRIMARY) HYPERTENSION   


Qualifiers: 


   Hypertension type: essential hypertension   Qualified Code(s): I10 - 

Essential (primary) hypertension   





Assessment/Plan





 Current Medications











Generic Name Dose Route Start Last Admin





  Trade Name Freq  PRN Reason Stop Dose Admin


 


Aspirin  81 mg  07/10/19 10:00  07/10/19 09:03





  Ecotrin -  PO   81 mg





  DAILY JUAN   Administration





     





     





     





     


 


Atorvastatin Calcium  10 mg  07/10/19 22:00  





  Lipitor -  PO   





  HS JUAN   





     





     





     





     


 


Clopidogrel Bisulfate  75 mg  07/10/19 10:00  07/10/19 09:02





  Plavix -  PO   75 mg





  DAILY JUAN   Administration





     





     





     





     


 


Donepezil HCl  5 mg  07/10/19 22:00  





  Aricept -  PO   





  HS JUAN   





     





     





     





     


 


Heparin Sodium (Porcine)  5,000 unit  07/10/19 06:00  07/10/19 06:13





  Heparin -  SQ   5,000 unit





  TID JUAN   Administration





     





     





     





     


 


Sodium Chloride  1,000 mls @ 50 mls/hr  07/10/19 01:45  07/10/19 02:26





  Normal Saline -  IV  07/11/19 01:36  50 mls/hr





  ASDIR JUAN   Administration





     





     





     





     


 


Metoprolol Tartrate  25 mg  07/10/19 10:00  07/10/19 09:02





  Lopressor -  PO   25 mg





  DAILY JUAN   Administration





     





     





     





     


 


Non-Formulary Medication  1 each  07/10/19 10:00  





  Oxybutynin [Oxytrol]  TD   





  DAILY JUAN   





     





     





     





     


 


Non-Formulary Medication  1 each  07/10/19 10:00  





  Cyclosporine [Restasis]  OP   





  BID JUAN   





     





     





     





     








Impression


1. BANDAR


2. s/p fall


3. arthritis


4. HTN


5. renal cyst





Plan


- renal function is improving


- renal ultrasound reviewed


- urine sodium is low


- bandar likely from prerenal disease


- cont hydration


- avoid nsaids, recommend that she not restart meloxicam and not take advil


- repeat labs in am

## 2019-07-10 NOTE — PN
Physical Exam: 


SUBJECTIVE: Patient seen and examined. No chest pain, no SOB, no neck pain. 








OBJECTIVE:





 Vital Signs











 Period  Temp  Pulse  Resp  BP Sys/Veloz  Pulse Ox


 


 Last 24 Hr  97.7 F-98.3 F  72-99  16-18  116-130/65-81  





 Vital Signs











Temp  98.7 F   07/10/19 22:00


 


Pulse  71   07/10/19 22:00


 


Resp  18   07/10/19 22:00


 


BP  137/79   07/10/19 22:00


 


Pulse Ox  96   07/10/19 21:00








 Intake & Output











 07/10/19 07/10/19 07/11/19





 11:59 23:59 11:59


 


Intake Total 550 400 


 


Output Total  602 


 


Balance 550 -202 


 


Weight 61.377 kg  


 


Intake:   


 


    


 


    Normal Saline - 1,000 ml 350  





    @ 50 mls/hr IV ASDIR JUAN   





    Rx#:[id number]   


 


  Oral 200 400 


 


Output:   


 


  Urine  602 


 


    Void  602 


 


Other:   


 


  Voiding Method Incontinent Incontinent 


 


  # Unmeasured Voids   


 


    Void 3 4 


 


  Bowel Movement No No 


 


  Height 1.57 m  


 


  Body Mass Index (BMI) 24.7  


 


  Weight Measurement Method Standing Scale  














GENERAL: The patient is awake, alert, and fully oriented, in no acute distress.


HEAD: Normal with no signs of trauma.


EYES: PERRL


ENT: oropharynx clear without exudates, moist mucous membranes.


NECK:Post neck, mild redness, no obvious swelling, full range of motion, 

supple. 


LUNGS: Breath sounds equal, clear to auscultation bilaterally, no wheezes, no 

crackles


HEART: Regular rate and rhythm, S1, S2 


ABDOMEN: Soft, nontender, nondistended, normoactive bowel sounds, no guarding


EXTREMITIES: Feet b/l rotated inwards, L thigh with healed biopsy scar


NEUROLOGICAL: No facial droop, no aphasia, no lateralizing signs. Strength 5/5 

globally. Cranial nerves II through XII grossly intact. Normal speech, gait not 

observed.


PSYCH: Pleasant


 CBC, BMP





 07/10/19 06:27 





 07/10/19 06:27 














 Laboratory Results - last 24 hr











  07/09/19 07/09/19 07/09/19





  18:06 21:30 23:00


 


WBC   3.8 L 


 


RBC   3.30 L 


 


Hgb   10.7 


 


Hct   32.2 L 


 


MCV   97.6 H 


 


MCH   32.5 


 


MCHC   33.3 


 


RDW   16.2 H 


 


Plt Count   257 


 


MPV   7.8 


 


Absolute Neuts (auto)   2.1 


 


Neutrophils %   56.0 


 


Lymphocytes %   36.1  D 


 


Monocytes %   4.0 


 


Eosinophils %   3.1 


 


Basophils %   0.8 


 


Nucleated RBC %   0 


 


Sodium  140  


 


Potassium  4.2  


 


Chloride  103  


 


Carbon Dioxide  32  


 


Anion Gap  5 L  


 


BUN  24.5 H  


 


Creatinine  1.7 H  


 


Est GFR (CKD-EPI)AfAm  32.21  


 


Est GFR (CKD-EPI)NonAf  27.80  


 


Random Glucose  102  


 


Uric Acid   


 


Calcium  9.2  


 


Phosphorus   


 


Magnesium   


 


Total Bilirubin  0.5  


 


AST  41 H  


 


ALT  58  


 


Alkaline Phosphatase  76  


 


Creatine Kinase   


 


Total Protein  6.7  


 


Albumin  3.7  


 


TSH   


 


Urine Color    Yellow


 


Urine Appearance    Clear


 


Urine pH    5.0  D


 


Ur Specific Gravity    1.015


 


Urine Protein    Negative


 


Urine Glucose (UA)    Negative


 


Urine Ketones    Negative


 


Urine Blood    Negative


 


Urine Nitrite    Negative


 


Urine Bilirubin    Negative


 


Urine Urobilinogen    0.2


 


Ur Leukocyte Esterase    Trace


 


Urine WBC (Auto)    3


 


Urine RBC (Auto)    2


 


Urine Casts (Auto)    4


 


U Epithel Cells (Auto)    1.2


 


Urine Bacteria (Auto)    7.8


 


Ur Random Creatinine   


 


Ur Random Sodium   














  07/10/19 07/10/19 07/10/19





  02:10 02:10 06:27


 


WBC    4.6


 


RBC    3.26 L


 


Hgb    10.6 L


 


Hct    31.4 L


 


MCV    96.2 H


 


MCH    32.4


 


MCHC    33.7


 


RDW    16.3 H


 


Plt Count    259


 


MPV    8.2


 


Absolute Neuts (auto)    2.8


 


Neutrophils %    61.5


 


Lymphocytes %    31.1


 


Monocytes %    2.9 L


 


Eosinophils %    4.1


 


Basophils %    0.4


 


Nucleated RBC %    0


 


Sodium   


 


Potassium   


 


Chloride   


 


Carbon Dioxide   


 


Anion Gap   


 


BUN   


 


Creatinine   


 


Est GFR (CKD-EPI)AfAm   


 


Est GFR (CKD-EPI)NonAf   


 


Random Glucose   


 


Uric Acid   


 


Calcium   


 


Phosphorus   


 


Magnesium   


 


Total Bilirubin   


 


AST   


 


ALT   


 


Alkaline Phosphatase   


 


Creatine Kinase   


 


Total Protein   


 


Albumin   


 


TSH   


 


Urine Color   


 


Urine Appearance   


 


Urine pH   


 


Ur Specific Gravity   


 


Urine Protein   


 


Urine Glucose (UA)   


 


Urine Ketones   


 


Urine Blood   


 


Urine Nitrite   


 


Urine Bilirubin   


 


Urine Urobilinogen   


 


Ur Leukocyte Esterase   


 


Urine WBC (Auto)   


 


Urine RBC (Auto)   


 


Urine Casts (Auto)   


 


U Epithel Cells (Auto)   


 


Urine Bacteria (Auto)   


 


Ur Random Creatinine   15.0 L 


 


Ur Random Sodium  19 L  














  07/10/19 07/10/19





  06:27 06:27


 


WBC  


 


RBC  


 


Hgb  


 


Hct  


 


MCV  


 


MCH  


 


MCHC  


 


RDW  


 


Plt Count  


 


MPV  


 


Absolute Neuts (auto)  


 


Neutrophils %  


 


Lymphocytes %  


 


Monocytes %  


 


Eosinophils %  


 


Basophils %  


 


Nucleated RBC %  


 


Sodium  145 


 


Potassium  3.5 


 


Chloride  108 H 


 


Carbon Dioxide  31 


 


Anion Gap  6 L 


 


BUN  18.2 H 


 


Creatinine  1.2 


 


Est GFR (CKD-EPI)AfAm  49.08 


 


Est GFR (CKD-EPI)NonAf  42.35 


 


Random Glucose  89 


 


Uric Acid  5.0 


 


Calcium  9.0 


 


Phosphorus  3.3 


 


Magnesium  2.3 


 


Total Bilirubin  0.6 


 


AST  37 


 


ALT  54 


 


Alkaline Phosphatase  78 


 


Creatine Kinase   109


 


Total Protein  6.5 


 


Albumin  3.5 


 


TSH  1.63 


 


Urine Color  


 


Urine Appearance  


 


Urine pH  


 


Ur Specific Gravity  


 


Urine Protein  


 


Urine Glucose (UA)  


 


Urine Ketones  


 


Urine Blood  


 


Urine Nitrite  


 


Urine Bilirubin  


 


Urine Urobilinogen  


 


Ur Leukocyte Esterase  


 


Urine WBC (Auto)  


 


Urine RBC (Auto)  


 


Urine Casts (Auto)  


 


U Epithel Cells (Auto)  


 


Urine Bacteria (Auto)  


 


Ur Random Creatinine  


 


Ur Random Sodium  








Active Medications











Generic Name Dose Route Start Last Admin





  Trade Name Freq  PRN Reason Stop Dose Admin


 


Aspirin  81 mg  07/10/19 10:00  07/10/19 09:03





  Ecotrin -  PO   81 mg





  DAILY [facility]   Administration





     





     





     





     


 


Atorvastatin Calcium  10 mg  07/10/19 22:00  





  Lipitor -  PO   





  HS JUAN   





     





     





     





     


 


Clopidogrel Bisulfate  75 mg  07/10/19 10:00  07/10/19 09:02





  Plavix -  PO   75 mg





  DAILY JUAN   Administration





     





     





     





     


 


Donepezil HCl  5 mg  07/10/19 22:00  





  Aricept -  PO   





  HS JUAN   





     





     





     





     


 


Heparin Sodium (Porcine)  5,000 unit  07/10/19 06:00  07/10/19 06:13





  Heparin -  SQ   5,000 unit





  TID JUAN   Administration





     





     





     





     


 


Sodium Chloride  1,000 mls @ 50 mls/hr  07/10/19 01:45  07/10/19 02:26





  Normal Saline -  IV  07/11/19 01:36  50 mls/hr





  ASDIR [facility]   Administration





     





     





     





     


 


Metoprolol Tartrate  25 mg  07/10/19 10:00  07/10/19 09:02





  Lopressor -  PO   25 mg





  DAILY JUAN   Administration





     





     





     





     


 


Non-Formulary Medication  1 each  07/10/19 10:00  





  Oxybutynin [Oxytrol]  TD   





  DAILY [facility]   





     





     





     





     








Ambulatory Orders





Aspirin Coated [Ecotrin -] 81 mg PO DAILY 11/25/12 


Amlodipine/Valsartan/Hcthiazid [Amlod-Valsa-Hctz -25 mg] 10 mg PO DAILY 02 /26/16 


Cholecalciferol (Vitamin D3) [Vitamin D3] 1,000 unit PO DAILY 06/24/16 


Cyanocobalamin [Vitamin B12 -] 1,000 mcg PO DAILY 06/24/16 


Donepezil HCl [Aricept -] 5 mg PO DAILY 07/15/18 


Imipramine HCl 50 mg PO HS 07/15/18 


Meloxicam 15 mg PO DAILY 07/15/18 


Allopurinol [Zyloprim -] 100 mg PO DAILY 02/13/19 


Atorvastatin Ca [Lipitor] 40 mg PO HS 02/13/19 


Clopidogrel Bisulfate [Plavix] 75 mg PO DAILY 02/13/19 


Losartan Potassium 50 mg PO DAILY 02/13/19 


Metoprolol Tartrate 50 mg PO DAILY 02/13/19 


Oxybutynin [Oxytrol] 5 each PO DAILY 02/13/19 


Cyclosporine [Restasis] 1 each OP BID 07/10/19 





 Current Medications





Aspirin (Ecotrin -)  81 mg PO DAILY [facility]


   Last Admin: 07/10/19 09:03 Dose:  81 mg


Atorvastatin Calcium (Lipitor -)  10 mg PO HS [facility]


Clopidogrel Bisulfate (Plavix -)  75 mg PO DAILY [facility]


   Last Admin: 07/10/19 09:02 Dose:  75 mg


Donepezil HCl (Aricept -)  5 mg PO HS [facility]


Heparin Sodium (Porcine) (Heparin -)  5,000 unit SQ TID [facility]


   Last Admin: 07/10/19 06:13 Dose:  5,000 unit


Sodium Chloride (Normal Saline -)  1,000 mls @ 50 mls/hr IV ASDIR [facility]


   Stop: 07/11/19 01:36


   Last Admin: 07/10/19 02:26 Dose:  50 mls/hr


Metoprolol Tartrate (Lopressor -)  25 mg PO DAILY [facility]


   Last Admin: 07/10/19 09:02 Dose:  25 mg


Non-Formulary Medication (Oxybutynin [Oxytrol])  1 each TD DAILY [facility]








ASSESSMENT/PLAN:


78 y/o female with PMH of HTN, MI s/p 2 stents, left sided carotid 

endarterectomy, gout, rhuematoid  arthritis presents to the ED with complaints 

of increasing weakness/poor PO intake subsequently found to have an BANDAR





#Weakness


-Generalized weakness, likely in setting of poor PO intake, able to tolerate a 

diet


-Weakness also compounded by medications- need medrec


-Improved on fluids





#S/p fall


-Likely mechanical


-head and c spine CT were negative


-patient is on multiple BP meds which could be contributing to some of this 

weakness in addition to poor PO intake


-currently holding some BP meds pending confirmation


-PT consulted





#BANDAR


patient was found to have an elevated Cr at 1.7 (baseline 1.1-1.3)


-patients daughter endorses that shes been having poor PO intake


-currently holding meloxicam and methotrexate, allopurinol


-renal US pending


-urine lytes and CPK pending


-NS @50mls/hr


-can consider nephro consult





#CAD s/p stents


Pt reports stents in July and aug 2018


c/w ASA and plavix





#HTN


patient is on multple BP meds which could be contributing to her weakness and 

possibly BANDAR


-holding losartan and comb pill


-c/w amlodipine and metoprolol


-monitor hemodynamics





#RA


currently holding patients methotrexate in light of BANDAR


-may suggest discussing need with PCP for methotrexate in 79 y.o 





#Dispo


Pt walked 25 feet with safety risk


Per CW- refusing SNF, does not have HHA, and would want to return home to 

family assistance











Visit type





- Emergency Visit


Emergency Visit: Yes


ED Registration Date: 07/10/19


Care time: The patient presented to the Emergency Department on the above date 

and was hospitalized for further evaluation of their emergent condition.





- New Patient


This patient is new to me today: Yes


Date on this admission: 07/10/19





- Critical Care


Critical Care patient: No





- Discharge Referral


Referred to [facility] No





ATTENDING PHYSICIAN STATEMENT





I saw and evaluated the patient.


I reviewed the resident's note and discussed the case with the resident.


I agree with the resident's findings and plan as documented.








SUBJECTIVE:








OBJECTIVE:








ASSESSMENT AND PLAN:

## 2019-07-10 NOTE — HP
CHIEF COMPLAINT:weakness; fall





PCP:Dr dillard





HISTORY OF PRESENT ILLNESS:


78 y/o female with PMH of HTN, MI s/p 2 stents, left sided carotid 

endartercectomy RA on methotrexate, gout, dementia, overactive bladder, 

presents to the ED rian to weakness and decreased PO intake- as per the daughter 

at bedside she states that her mother had what appears to have been a 

mechanical fall on friday (she states that her mother was walking to the 

recliner and somehow toppled over ; no LOC no prodromal symptoms were 

experienced). she states that she feels her mother has been having trouble 

walking- she uses her cane but has been noticing her mother has been holding on 

more upon walking . IN addition, she notes that her mothers appetite has been 

decreasing. she feels her mothers cognitive ability has been declining over the 

past few weeks and that sometimes her mother forget shes hungry and will not 

eat. she endorses having a productive cough with white sputum that has been 

lingering for a few months. she denies any sick contacts or any recent travel





ER course was notable for:


(1) vitals wnl


(2)Cr 1.7 (baseline 1.1-1.3)


(3)head and c- spine CT negative for any acute pathology





Recent Travel:


denies


PAST MEDICAL HISTORY:


see above


PAST SURGICAL HISTORY:


denies


Social History:


Smoking:denies


Alcohol:denies


Drugs:denies





Family History: N/A 


Allergies





No Known Drug Allergies Allergy (Intermediate, Verified 07/09/19 18:00)


 








HOME MEDICATIONS:


 Home Medications











 Medication  Instructions  Recorded


 


Aspirin Coated [Ecotrin -] 81 mg PO DAILY 11/25/12


 


Amlodipine/Valsartan/Hcthiazid 10 mg PO DAILY 02/26/16





[Amlod-Valsa-Hctz -25 mg]  


 


Cholecalciferol (Vitamin D3) 1,000 unit PO DAILY 06/24/16





[Vitamin D3]  


 


Cyanocobalamin [Vitamin B12 -] 1,000 mcg PO DAILY 06/24/16


 


Donepezil HCl [Aricept -] 5 mg PO DAILY 07/15/18


 


Imipramine HCl 50 mg PO DAILY 07/15/18


 


Meloxicam 15 mg PO DAILY 07/15/18


 


Allopurinol [Zyloprim -] 100 mg PO DAILY 02/13/19


 


Atorvastatin Ca [Lipitor] 40 mg PO HS 02/13/19


 


Clopidogrel Bisulfate [Plavix] 75 mg PO DAILY 02/13/19


 


Losartan Potassium 50 mg PO DAILY 02/13/19


 


Metoprolol Tartrate 50 mg PO DAILY 02/13/19


 


Oxybutynin [Oxytrol] 5 each PO DAILY 02/13/19








REVIEW OF SYSTEMS


CONSTITUTIONAL: 


Present: generalized weakness Absent:  fever, chills, diaphoresis,  malaise, 

loss of appetite, weight change


HEENT: 


Absent:  rhinorrhea, nasal congestion, throat pain, throat swelling, difficulty 

swallowing, mouth swelling, ear pain, eye pain, visual changes


CARDIOVASCULAR: 


Absent: chest pain, syncope, palpitations, irregular heart rate, lightheadedness

, peripheral edema


RESPIRATORY: 


Absent: cough, shortness of breath, dyspnea with exertion, orthopnea, wheezing, 

stridor, hemoptysis


GASTROINTESTINAL:


Absent: abdominal pain, abdominal distension, nausea, vomiting, diarrhea, 

constipation, melena, hematochezia


GENITOURINARY: 


Absent: dysuria, frequency, urgency, hesitancy, hematuria, flank pain, genital 

pain


MUSCULOSKELETAL: 


Absent: myalgia, arthralgia, joint swelling, back pain, neck pain


SKIN: 


Absent: rash, itching, pallor


HEMATOLOGIC/IMMUNOLOGIC: 


Absent: easy bleeding, easy bruising, lymphadenopathy, frequent infections


ENDOCRINE:


Absent: unexplained weight gain, unexplained weight loss, heat intolerance, 

cold intolerance


NEUROLOGIC: 


Absent: headache, focal weakness or paresthesias, dizziness, unsteady gait, 

seizure, mental status changes, bladder or bowel incontinence


PSYCHIATRIC: 


Absent: anxiety, depression, suicidal or homicidal ideation, hallucinations.








PHYSICAL EXAMINATION


 Vital Signs - 24 hr











  07/09/19 07/09/19





  18:01 22:01


 


Temperature 98.3 F 


 


Pulse Rate 99 H 


 


Respiratory 16 





Rate  


 


Blood Pressure 116/65 


 


O2 Sat by Pulse 100 100





Oximetry (%)  











GENERAL: Awake, alert, and fully oriented, in no acute distress.


EYES:PEERLA: EOMI no scleral icterus 


NECK: no JVD: no lymphadenopathy


LUNGS: CTA B/L; no rales, rhonchi or wheezing


HEART: Regular rate and rhythm, normal S1 and S2 without murmur, rub or gallop.


ABDOMEN: Soft, nontender, not distended, normoactive bowel sounds, no guarding, 

no rebound, no masses.  No hepatomegaly or  splenomegaly. 


MUSCULOSKELETAL: Normal range of motion at all joints. No bony deformities or 

tenderness. No CVA tenderness.


EXTREMITIES: warm; well-perfused no clubbing/cyanosis or edema 


NEUROLOGICAL:  Cranial nerves II-XII intact. Normal speech. strength 5/5 BL UE/

LE; sensation intact throughout no facial droop or asymmetry


PSYCHIATRIC: Cooperative. Good eye contact. Appropriate mood and affect.


SKIN: Warm, dry, normal turgor, no rashes or lesions noted, normal capillary 

refill. 


 Laboratory Results - last 24 hr











  07/09/19 07/09/19 07/09/19





  18:06 21:30 23:00


 


WBC   3.8 L 


 


RBC   3.30 L 


 


Hgb   10.7 


 


Hct   32.2 L 


 


MCV   97.6 H 


 


MCH   32.5 


 


MCHC   33.3 


 


RDW   16.2 H 


 


Plt Count   257 


 


MPV   7.8 


 


Absolute Neuts (auto)   2.1 


 


Neutrophils %   56.0 


 


Lymphocytes %   36.1  D 


 


Monocytes %   4.0 


 


Eosinophils %   3.1 


 


Basophils %   0.8 


 


Nucleated RBC %   0 


 


Sodium  140  


 


Potassium  4.2  


 


Chloride  103  


 


Carbon Dioxide  32  


 


Anion Gap  5 L  


 


BUN  24.5 H  


 


Creatinine  1.7 H  


 


Est GFR (CKD-EPI)AfAm  32.21  


 


Est GFR (CKD-EPI)NonAf  27.80  


 


Random Glucose  102  


 


Calcium  9.2  


 


Total Bilirubin  0.5  


 


AST  41 H  


 


ALT  58  


 


Alkaline Phosphatase  76  


 


Total Protein  6.7  


 


Albumin  3.7  


 


Urine Color    Yellow


 


Urine Appearance    Clear


 


Urine pH    5.0  D


 


Ur Specific Gravity    1.015


 


Urine Protein    Negative


 


Urine Glucose (UA)    Negative


 


Urine Ketones    Negative


 


Urine Blood    Negative


 


Urine Nitrite    Negative


 


Urine Bilirubin    Negative


 


Urine Urobilinogen    0.2


 


Ur Leukocyte Esterase    Trace


 


Urine WBC (Auto)    3


 


Urine RBC (Auto)    2


 


Urine Casts (Auto)    4


 


U Epithel Cells (Auto)    1.2


 


Urine Bacteria (Auto)    7.8











ASSESSMENT/PLAN:


78 y/o female with PMH of HTN, MI s/p 2 stents, left sided carotid 

endarterectomy, gout, rhuematoid  arthritis presents to the ED with complaints 

of increasing weakness/poor PO intake subsequently found to have an BANDAR





#Weakness


patient has been having increased weakness which has been getting progressively 

worse after undergoing a mechanical fall


-head and c spine CT were negative


-patient is on multiple BP meds which could be contributing to some of this 

weakness in addition to poor PO intake


-currently holding some BP meds


-PT consulted





#BANDAR


patient was found to have an elevated Cr at 1.7 (baseline 1.1-1.3)


-patients daughter endorses that shes been having poor PO intake


-currently holding meloxicam and methotrexate


-renal US pending


-urine lytes and CPK pending


-NS @50mls/hr


-can consider nephro consult





#CAD s/p stents


c/w ASA and plavix





#HTN


patient is on multple BP meds which could be contributing to her weakness and 

possibly BANDAR


-holding losartan and comb pill


-c/w amlodipine and metoprolol


-monitor hemodynamics





#RA


currently holding patients methotrexate in light of BANDAR


-may suggest discussing need with PCP for methotrexate in 79 y.o 





F/E/N 


NS @50mls/hr


monitor electrolytes


sodum controlled diet




















Problem List





- Problem


(1) BANDAR (acute kidney injury)


Code(s): N17.9 - ACUTE KIDNEY FAILURE, UNSPECIFIED   





(2) Weakness


Code(s): R53.1 - WEAKNESS   





(3) HTN (hypertension)


Code(s): I10 - ESSENTIAL (PRIMARY) HYPERTENSION   


Qualifiers: 


   Hypertension type: essential hypertension   Qualified Code(s): I10 - 

Essential (primary) hypertension   





Visit type





- Emergency Visit


Emergency Visit: Yes


ED Registration Date: 07/10/19


Care time: The patient presented to the Emergency Department on the above date 

and was hospitalized for further evaluation of their emergent condition.





- New Patient


This patient is new to me today: Yes


Date on this admission: 07/10/19





- Critical Care


Critical Care patient: No





ATTENDING PHYSICIAN STATEMENT





I saw and evaluated the patient.


I reviewed the resident's note and discussed the case with the resident.


I agree with the resident's findings and plan as documented.








SUBJECTIVE:








OBJECTIVE:








ASSESSMENT AND PLAN:

## 2019-07-10 NOTE — PN
Teaching Attending Note


Name of Resident: Chelsea Dorado





ATTENDING PHYSICIAN STATEMENT





I saw and evaluated the patient.


I reviewed the resident's note and discussed the case with the resident.


I agree with the resident's findings and plan as documented.








SUBJECTIVE:


Patient is an 79 year old woman with PMH of Dementia, Overactive bladder, HLD, 

NSTEMI (7/2018 s/p PCI), HTN, Gout, Left carotid endarterectomy and Rheumatoid 

arthritis (?on Methotrexate and Meloxicam) who presents for evaluation 

following a fall. Patient is accompanied by family who assists in providing the 

history. They state that the patient was sitting in a recliner chair and when 

it lifted quickly, patient fell out striking her head on a dresser 2 days ago. 

The patient has been experiencing worsening neck pain over the past 2 days 

prompting her presentation to the ER for further evaluation. Has had poor oral 

intake. They also note some generalized weakness but otherwise deny headache, 

fevers, chills, SOB, chest pain, nausea, vomiting, abdominal pain, numbness, 

tingling, focal weakness, or changes with urination or bowel movements. No sick 

contacts or recent travel. 





OBJECTIVE:


Alert and orthostatic


 Vital Signs











 Period  Temp  Pulse  Resp  BP Sys/Veloz  Pulse Ox


 


 Last 24 Hr  98.3 F  99  16  116/65  100








HEENT: No Jaundice, eye redness or discharge, PERRLA, EOMI. Normocephalic, 

atraumatic. External ears are normal and hearing is grossly intact. No nasal 

discharge.


Neck: Supple, nontender. No palpable adenopathy or thyromegaly. No JVD


Chest: Good effort. Clear to auscultation and percussion.


Heart: Regular. No S3, rub or murmur


Abdomen: Not distended, soft, nontender and no HSM. No rebound or guarding.  

Normal bowel sounds.


Ext: Peripheral pulses intact. No leg edema.


Skin: Warm and dry. No petechiae, rash or ecchymosis.


Neuro: Alert. Oriented x3. CN 2-12 grossly intact. Sensation grossly intact in 

all four extremities and DTR are symmetric.


Psych: Appropriate mood and affect. Good insight.





 Home Medications











 Medication  Instructions  Recorded


 


Aspirin Coated [Ecotrin -] 81 mg PO DAILY 11/25/12


 


Amlodipine/Valsartan/Hcthiazid 1 each PO DAILY 02/26/16





[Amlod-Valsa-Hctz -25 mg]  


 


Cholecalciferol (Vitamin D3) 1,000 unit PO DAILY 06/24/16





[Vitamin D3]  


 


Cyanocobalamin [Vitamin B12 -] 1,000 mcg PO DAILY 06/24/16


 


Donepezil HCl [Aricept -] 5 mg PO DAILY 07/15/18


 


Imipramine HCl 50 mg PO DAILY 07/15/18


 


Meloxicam 15 mg PO DAILY 07/15/18


 


Methotrexate Sodium [Methotrexate] 20 mg PO WEEKLY 07/15/18


 


Allopurinol [Zyloprim -] 100 mg PO DAILY 02/13/19


 


Atorvastatin Ca [Lipitor] 10 mg PO HS 02/13/19


 


Clopidogrel Bisulfate [Plavix] 75 mg PO DAILY 02/13/19


 


Losartan Potassium 25 mg PO DAILY 02/13/19


 


Metoprolol Tartrate 25 mg PO DAILY 02/13/19


 


Oxybutynin [Oxytrol] 1 each TD DAILY 02/13/19


 


Cephalexin [Keflex] 500 mg PO BID #10 capsule 02/14/19








 Abnormal Lab Results











  07/09/19 07/09/19





  18:06 21:30


 


WBC   3.8 L


 


RBC   3.30 L


 


Hct   32.2 L


 


MCV   97.6 H


 


RDW   16.2 H


 


Anion Gap  5 L 


 


BUN  24.5 H 


 


Creatinine  1.7 H 


 


AST  41 H 








ASSESSMENT AND PLAN:


1. Fall/Weakness/BANDAR - Weakness likely partly due to medication adverse effect, 

hypotension and dehydration. Patient is on 5 antihypertensive drugs - some at 

high doses as well as imipramine. Also on methotrexate and meloxicam - the 

latter may be contributing to BANDAR. Will hold antihypertensive drugs, give IV 

fluids gently, check uric acid, CPK and get kidney sonogram. Monitor urine 

output. No acute pathology on head CT and C-spine CT showed degenerative 

arthrtis changes. EKG is pending. Will consult nephrology and avoid nephrotoxic 

agents such as NSAIDS, aminoglycosides, contrast dyes and certain Alternative 

medicine products.





2. Anemia - Cause unclear. Will do basic anemia work up including serial stool 

guaiacs, reticulocyte count and iron studies. Would benefit from Procrit 

therapy once iron replete.  





3. Hypertension - Will hold antihypertensive medications for now and restart 

suitable outpatient antihypertensive drugs when clinically appropriate. Revise 

regimen to ensure good BP control. Nonpharmacologic measures to control 

hypertension like weight loss, salt restriction and exercise discussed. 





4. DVT prophylaxis - Heparin 5000 u SQ tid.   





5. Advance directives - Full code

## 2019-07-10 NOTE — PN
Teaching Attending Note


Name of Resident: Mya Miller





ATTENDING PHYSICIAN STATEMENT





I saw and evaluated the patient.


I reviewed the resident's note and discussed the case with the resident.


I agree with the resident's findings and plan as documented.








SUBJECTIVE: Ms Angeles says she is feeling much better today than she did at 

home. Complains of weakness but this is chronic. No cp, sob, n/v.








OBJECTIVE:


 Last Vital Signs











Temp Pulse Resp BP Pulse Ox


 


 36.6 C   79   18   124/81   95 


 


 07/10/19 07:54  07/10/19 07:54  07/10/19 07:55  07/10/19 07:54  07/10/19 07:55








Gen: nad


Pulm: ctab w/o w/r/r


CV: rrr w/o m/r/g


Abd: +bs, s/nt/nd


Ext: no c/c/e





 CBC, BMP





 07/10/19 06:27 





 07/10/19 06:27 














ASSESSMENT AND PLAN:








Problem List





- Problems


(1) BANDAR (acute kidney injury)


Assessment/Plan: 


-improving with hydration


-renal ultrasound reviewed


-suspect secondary to dehydration, antihypertensives, and NSAIDs


-continue hydration currently


-follow up Dr Rivera's recommendations


-check again in am


Code(s): N17.9 - ACUTE KIDNEY FAILURE, UNSPECIFIED   





(2) Weakness


Assessment/Plan: 


-multifactorial but she states is chronic


-PT consult


Code(s): R53.1 - WEAKNESS   





(3) HTN (hypertension)


Assessment/Plan: 


-well controlled and currently only on metoprolol


-monitor today, may elevate and need to add back antihypertensives


-will confirm medications, patient is listed to be on both losartan and a 

combination of amlodipine/valsartan/HCTZ and suspect this is inaccurate


-if on combination medication, will stop thiazide diuretic





Code(s): I10 - ESSENTIAL (PRIMARY) HYPERTENSION   


Qualifiers: 


   Hypertension type: essential hypertension   Qualified Code(s): I10 - 

Essential (primary) hypertension   





(4) Rheumatoid arthritis


Assessment/Plan: 


-stable


-will restart methotrexate when creatinine normalizes


-stop meloxicam if tolerated


Code(s): M06.9 - RHEUMATOID ARTHRITIS, UNSPECIFIED   





(5) Anemia


Assessment/Plan: 


-minimal


-will check iron and vitamin labs


-patient does not need epogen at this time


Code(s): D64.9 - ANEMIA, UNSPECIFIED   


Qualifiers: 


   Anemia type: unspecified type   Qualified Code(s): D64.9 - Anemia, 

unspecified   





(6) Gout


Assessment/Plan: 


-not in exacerbation


-will confirm allopurinol dose


Code(s): M10.9 - GOUT, UNSPECIFIED

## 2019-07-11 VITALS — TEMPERATURE: 98.2 F | HEART RATE: 73 BPM

## 2019-07-11 VITALS — SYSTOLIC BLOOD PRESSURE: 111 MMHG | DIASTOLIC BLOOD PRESSURE: 74 MMHG

## 2019-07-11 LAB
ALBUMIN SERPL-MCNC: 3.5 G/DL (ref 3.4–5)
ALP SERPL-CCNC: 82 U/L (ref 45–117)
ALT SERPL-CCNC: 70 U/L (ref 13–61)
ANION GAP SERPL CALC-SCNC: 4 MMOL/L (ref 8–16)
AST SERPL-CCNC: 57 U/L (ref 15–37)
BASOPHILS # BLD: 0.9 % (ref 0–2)
BILIRUB SERPL-MCNC: 0.6 MG/DL (ref 0.2–1)
BUN SERPL-MCNC: 9.7 MG/DL (ref 7–18)
CALCIUM SERPL-MCNC: 9.3 MG/DL (ref 8.5–10.1)
CHLORIDE SERPL-SCNC: 107 MMOL/L (ref 98–107)
CO2 SERPL-SCNC: 32 MMOL/L (ref 21–32)
CREAT SERPL-MCNC: 1 MG/DL (ref 0.55–1.3)
DEPRECATED RDW RBC AUTO: 16.5 % (ref 11.6–15.6)
EOSINOPHIL # BLD: 6.8 % (ref 0–4.5)
GLUCOSE SERPL-MCNC: 133 MG/DL (ref 74–106)
HCT VFR BLD CALC: 33.9 % (ref 32.4–45.2)
HGB BLD-MCNC: 11.1 GM/DL (ref 10.7–15.3)
LYMPHOCYTES # BLD: 41.3 % (ref 8–40)
MAGNESIUM SERPL-MCNC: 2 MG/DL (ref 1.8–2.4)
MCH RBC QN AUTO: 32.1 PG (ref 25.7–33.7)
MCHC RBC AUTO-ENTMCNC: 32.8 G/DL (ref 32–36)
MCV RBC: 97.7 FL (ref 80–96)
MONOCYTES # BLD AUTO: 3.7 % (ref 3.8–10.2)
NEUTROPHILS # BLD: 47.3 % (ref 42.8–82.8)
PHOSPHATE SERPL-MCNC: 2.5 MG/DL (ref 2.5–4.9)
PLATELET # BLD AUTO: 279 K/MM3 (ref 134–434)
PMV BLD: 8.5 FL (ref 7.5–11.1)
POTASSIUM SERPLBLD-SCNC: 3.6 MMOL/L (ref 3.5–5.1)
PROT SERPL-MCNC: 6.7 G/DL (ref 6.4–8.2)
RBC # BLD AUTO: 3.47 M/MM3 (ref 3.6–5.2)
SODIUM SERPL-SCNC: 143 MMOL/L (ref 136–145)
WBC # BLD AUTO: 4.2 K/MM3 (ref 4–10)

## 2019-07-11 RX ADMIN — METOPROLOL TARTRATE SCH MG: 25 TABLET, FILM COATED ORAL at 10:23

## 2019-07-11 RX ADMIN — CLOPIDOGREL BISULFATE SCH MG: 75 TABLET, FILM COATED ORAL at 10:23

## 2019-07-11 RX ADMIN — Medication SCH EACH: at 10:24

## 2019-07-11 RX ADMIN — HEPARIN SODIUM SCH: 5000 INJECTION, SOLUTION INTRAVENOUS; SUBCUTANEOUS at 14:42

## 2019-07-11 RX ADMIN — HEPARIN SODIUM SCH UNIT: 5000 INJECTION, SOLUTION INTRAVENOUS; SUBCUTANEOUS at 06:13

## 2019-07-11 RX ADMIN — ASPIRIN SCH MG: 81 TABLET, COATED ORAL at 10:23

## 2019-07-11 NOTE — PN
Progress Note, Physician


History of Present Illness: 





Pt seen and examined at bedside. She is awake and alert. She denies shortness 

of breath. She is asking to go home. 





- Current Medication List


Current Medications: 


Active Medications





Aspirin (Ecotrin -)  81 mg PO DAILY [facility]


   Last Admin: 07/11/19 10:23 Dose:  81 mg


Atorvastatin Calcium (Lipitor -)  10 mg PO HS [facility]


   Last Admin: 07/10/19 22:47 Dose:  10 mg


Clopidogrel Bisulfate (Plavix -)  75 mg PO DAILY [facility]


   Last Admin: 07/11/19 10:23 Dose:  75 mg


Donepezil HCl (Aricept -)  5 mg PO HS [facility]


   Last Admin: 07/10/19 22:47 Dose:  5 mg


Heparin Sodium (Porcine) (Heparin -)  5,000 unit SQ TID [facility]


   Last Admin: 07/11/19 06:13 Dose:  5,000 unit


Metoprolol Tartrate (Lopressor -)  25 mg PO DAILY [facility]


   Last Admin: 07/11/19 10:23 Dose:  25 mg


Non-Formulary Medication (Oxybutynin [Oxytrol])  1 each TD DAILY [facility]


Non-Formulary Medication (Cyclosporine [Restasis])  0 each OU BID [facility]


   Last Admin: 07/11/19 10:24 Dose:  1 each











- Objective


Vital Signs: 


 Vital Signs











Temperature  97.7 F   07/11/19 05:46


 


Pulse Rate  72   07/11/19 05:46


 


Respiratory Rate  18   07/11/19 10:30


 


Blood Pressure  111/74   07/11/19 10:30


 


O2 Sat by Pulse Oximetry (%)  96   07/11/19 09:00











Constitutional: Yes: Calm


Eyes: Yes: Conjunctiva Clear


HENT: Yes: Atraumatic


Neck: Yes: Supple


Cardiovascular: Yes: S1, S2


Respiratory: Yes: CTA Bilaterally


Gastrointestinal: Yes: Soft


Genitourinary: Yes: WNL


Musculoskeletal: Yes: WNL


Edema: No


Neurological: Yes: Oriented


Psychiatric: Yes: Oriented


Labs: 


 CBC, BMP





 07/11/19 09:55 





 07/11/19 09:55 











Problem List





- Problems


(1) GOKUL (acute kidney injury)


Code(s): N17.9 - ACUTE KIDNEY FAILURE, UNSPECIFIED   





(2) HTN (hypertension)


Code(s): I10 - ESSENTIAL (PRIMARY) HYPERTENSION   


Qualifiers: 


   Hypertension type: essential hypertension   Qualified Code(s): I10 - 

Essential (primary) hypertension   





Assessment/Plan


 Current Medications











Generic Name Dose Route Start Last Admin





  Trade Name Crystal  PRN Reason Stop Dose Admin


 


Aspirin  81 mg  07/10/19 10:00  07/11/19 10:23





  Ecotrin -  PO   81 mg





  DAILY JUAN   Administration





     





     





     





     


 


Atorvastatin Calcium  10 mg  07/10/19 22:00  07/10/19 22:47





  Lipitor -  PO   10 mg





  HS JUAN   Administration





     





     





     





     


 


Clopidogrel Bisulfate  75 mg  07/10/19 10:00  07/11/19 10:23





  Plavix -  PO   75 mg





  DAILY JUAN   Administration





     





     





     





     


 


Donepezil HCl  5 mg  07/10/19 22:00  07/10/19 22:47





  Aricept -  PO   5 mg





  HS JUAN   Administration





     





     





     





     


 


Heparin Sodium (Porcine)  5,000 unit  07/10/19 06:00  07/11/19 06:13





  Heparin -  SQ   5,000 unit





  TID JUAN   Administration





     





     





     





     


 


Metoprolol Tartrate  25 mg  07/10/19 10:00  07/11/19 10:23





  Lopressor -  PO   25 mg





  DAILY JUAN   Administration





     





     





     





     


 


Non-Formulary Medication  1 each  07/10/19 10:00  





  Oxybutynin [Oxytrol]  TD   





  DAILY JUAN   





     





     





     





     


 


Non-Formulary Medication  0 each  07/10/19 22:00  07/11/19 10:24





  Cyclosporine [Restasis]  OU   1 each





  BID JUAN   Administration





     





     





     





     











Impression


1. GOKUL


2. s/p fall


3. arthritis


4. HTN


5. renal cyst





Plan


- renal function improved


- encourage PO intake


- discussed plan with pt and family


- gokul likely from prerenal disease


- avoid nsaids, recommend that she not restart meloxicam and not take advil

## 2019-07-11 NOTE — PN
Physical Exam: 


SUBJECTIVE: Patient seen and examined








OBJECTIVE:





 Vital Signs











 Period  Temp  Pulse  Resp  BP Sys/Veloz  Pulse Ox


 


 Last 24 Hr  97.7 F-99.0 F  63-73  18-20  115-141/70-79  96











GENERAL: The patient is awake, alert, and fully oriented, in no acute distress.


HEAD: Normal with no signs of trauma.


EYES: PERRL, extraocular movements intact, sclera anicteric, conjunctiva clear. 

No ptosis. 


ENT: Ears normal, nares patent, oropharynx clear without exudates, moist mucous 


membranes.


NECK: Trachea midline, full range of motion, supple. 


LUNGS: Breath sounds equal, clear to auscultation bilaterally, no wheezes, no 

crackles, no 


accessory muscle use. 


HEART: Regular rate and rhythm, S1, S2 without murmur, rub or gallop.


ABDOMEN: Soft, nontender, nondistended, normoactive bowel sounds, no guarding, 

no 


rebound, no hepatosplenomegaly, no masses.


EXTREMITIES: 2+ pulses, warm, well-perfused, no edema. 


NEUROLOGICAL: Cranial nerves II through XII grossly intact. Normal speech, gait 

not 


observed.


PSYCH: Normal mood, normal affect.


SKIN: Warm, dry, normal turgor, no rashes or lesions noted














Active Medications











Generic Name Dose Route Start Last Admin





  Trade Name Freq  PRN Reason Stop Dose Admin


 


Aspirin  81 mg  07/10/19 10:00  07/10/19 09:03





  Ecotrin -  PO   81 mg





  DAILY JUAN   Administration





     





     





     





     


 


Atorvastatin Calcium  10 mg  07/10/19 22:00  07/10/19 22:47





  Lipitor -  PO   10 mg





  HS JUAN   Administration





     





     





     





     


 


Clopidogrel Bisulfate  75 mg  07/10/19 10:00  07/10/19 09:02





  Plavix -  PO   75 mg





  DAILY JUAN   Administration





     





     





     





     


 


Donepezil HCl  5 mg  07/10/19 22:00  07/10/19 22:47





  Aricept -  PO   5 mg





  HS JUAN   Administration





     





     





     





     


 


Heparin Sodium (Porcine)  5,000 unit  07/10/19 06:00  07/11/19 06:13





  Heparin -  SQ   5,000 unit





  TID JUAN   Administration





     





     





     





     


 


Metoprolol Tartrate  25 mg  07/10/19 10:00  07/10/19 09:02





  Lopressor -  PO   25 mg





  DAILY JUAN   Administration





     





     





     





     


 


Non-Formulary Medication  1 each  07/10/19 10:00  





  Oxybutynin [Oxytrol]  TD   





  DAILY JUAN   





     





     





     





     


 


Non-Formulary Medication  0 each  07/10/19 22:00  07/10/19 22:47





  Cyclosporine [Restasis]  OU   1 each





  BID JUAN   Administration





     





     





     





     











ASSESSMENT/PLAN:








ATTENDING PHYSICIAN STATEMENT





I saw and evaluated the patient.


I reviewed the resident's note and discussed the case with the resident.


I agree with the resident's findings and plan as documented.








SUBJECTIVE:








OBJECTIVE:








ASSESSMENT AND PLAN:

## 2019-07-11 NOTE — DS
Physical Exam: 


SUBJECTIVE: Patient seen and examined. Laying comfortably in bed, requesting to 

go home. Denies pain, SOB or chest pain.








OBJECTIVE:





 Vital Signs











 Period  Temp  Pulse  Resp  BP Sys/Veloz  Pulse Ox


 


 Last 24 Hr  97.7 F-99.0 F  63-73  18-20  111-141/73-79  96-96





 Vital Signs











Temp  98.2 F   07/11/19 14:30


 


Pulse  73   07/11/19 14:30


 


Resp  20   07/11/19 14:30


 


BP  111/74   07/11/19 10:30


 


Pulse Ox  96   07/11/19 09:00











PHYSICAL EXAM





GENERAL: The patient is awake, alert, and fully oriented, in no acute distress.


HEAD: Normal with no signs of trauma.


EYES: PERRL, extraocular movements intact, sclera anicteric, conjunctiva clear. 


ENT: moist mucous membranes.


LUNGS: Breath sounds equal, clear to auscultation bilaterally, no wheezes, no 

crackles


HEART: S1, S2 


ABDOMEN: Soft, nontender, nondistended, normoactive bowel sounds


EXTREMITIES: 2+ pulses, warm, well-perfused, no edema. 


NEUROLOGICAL: Cranial nerves II through XII grossly intact. Normal speech, gait 

not observed.








LABS


 Laboratory Results - last 24 hr











  07/11/19 07/11/19





  09:55 09:55


 


WBC  4.2 


 


RBC  3.47 L 


 


Hgb  11.1 


 


Hct  33.9 


 


MCV  97.7 H 


 


MCH  32.1 


 


MCHC  32.8 


 


RDW  16.5 H 


 


Plt Count  279 


 


MPV  8.5 


 


Absolute Neuts (auto)  2.0 


 


Neutrophils %  47.3  D 


 


Lymphocytes %  41.3 H D 


 


Monocytes %  3.7 L 


 


Eosinophils %  6.8 H 


 


Basophils %  0.9 


 


Nucleated RBC %  0 


 


Sodium   143


 


Potassium   3.6


 


Chloride   107


 


Carbon Dioxide   32


 


Anion Gap   4 L


 


BUN   9.7


 


Creatinine   1.0


 


Est GFR (CKD-EPI)AfAm   61.19


 


Est GFR (CKD-EPI)NonAf   52.79


 


Random Glucose   133 H


 


Calcium   9.3


 


Phosphorus   2.5


 


Magnesium   2.0


 


Total Bilirubin   0.6


 


AST   57 H


 


ALT   70 H


 


Alkaline Phosphatase   82


 


Total Protein   6.7


 


Albumin   3.5





Ambulatory Orders





Aspirin Coated [Ecotrin -] 81 mg PO DAILY 11/25/12 


Cyanocobalamin [Vitamin B12 -] 1,000 mcg PO DAILY 06/24/16 


Donepezil HCl [Aricept -] 5 mg PO DAILY 07/15/18 


Imipramine HCl 50 mg PO HS 07/15/18 


Allopurinol [Zyloprim -] 100 mg PO DAILY 02/13/19 


Atorvastatin Ca [Lipitor] 40 mg PO HS 02/13/19 


Clopidogrel Bisulfate [Plavix] 75 mg PO DAILY 02/13/19 


Losartan Potassium 50 mg PO DAILY 02/13/19 


Oxybutynin [Oxytrol] 5 each PO DAILY 02/13/19 


Cyclosporine [Restasis] 1 drop OU HS 07/10/19 


Budesonide/Formeterol Fumarate [SYMBICORT 160/4.5mcg -] 1 inh PO BID 07/11/19 


Famotidine [Pepcid] 40 mg PO DAILY 07/11/19 


Folic Acid 1 mg PO DAILY 07/11/19 


Methotrexate [Mexate -] 20 mg PO WEEKLY 07/11/19 


Metoprolol Succinate 25 mg PO DAILY 07/11/19 











HOSPITAL COURSE:





Date of Admission:07/10/19





Date of Discharge: 07/11/19





78 y/o female with PMH of HTN, MI s/p 2 stents, left sided carotid 

endarterectomy, gout, rhuematoid  arthritis presents to the ED with complaints 

of increasing weakness/poor PO intake and recent fall, subsequently found to 

have an BANDAR. Pt was gently rehydrated, with resolution of her BANDAR.  Pt was 

found to be on multiple blood pressure medications with relatively low blood 

pressures initially but maintained SBPs in the 140s. Losartan was held 

initially and metoprolol continued. At discharge, losartan was added back along 

with lopressor, but amlodipine was held pending evaluation by her PCP. She was 

assessed by PT and found to be unstable on her feet and recommended for a SNF. 

The family however declined having her in a SNF, and offered to provide care at 

home. Pt was discharged home after discussion with the family for increased 

supervision of pt with ambulation to avoid mechanical falls.




















Minutes to complete discharge: 38





Discharge Summary


Reason For Visit: ACUTE KIDNEY INJURY, WEAKNESS


Condition: Stable





- Instructions


Diet, Activity, Other Instructions: 


You came in after a fall. 


You could have fallen because you were unstable on your feet despite using the 

cane as support, or because you had not been eating and were weak


We gave you intravenous fluids and you improved





You had some damage to your kidneys probably due to dehydration that has 

improved





Medications


Stop taking the Meloxicam as it can damage your kidneys and increase your risk 

for a bleed


We also stopped you from taking losartan, while your kidneys recovered


Follow up with your primary care to determine if you will continue Losartan


You are also taking allopurinol and methotrexate


Continue your other medications as prescribed





Follow up


Follow up with your rheumatologist about allopurinol and methotrexate as they 

could also affect the kidneys


Follow up for a repeat blood test BMP to check your kidneys in one week





Recommendations


Physical therapy assessed you and recommended you for a Short Term Nursing 

Facility as you needed assistance with ambulation but you declined


You are at risk of more falls, you will need increased support and watchfulness 

especially when you walk


You may also need supervision on eating so you do not get dehydrated





If you think your symptoms are not gettig better with increasing falls, weakness

, shortness or breath or chest pain, please return to the nearest emergency room








Referrals: 


Kuldip Gunn MD [Primary Care Provider] - 1 Week


Disposition: VNS/HOME HEALTH CARE





- Home Medications


Comprehensive Discharge Medication List: 


Ambulatory Orders





Aspirin Coated [Ecotrin -] 81 mg PO DAILY 11/25/12 


Cyanocobalamin [Vitamin B12 -] 1,000 mcg PO DAILY 06/24/16 


Donepezil HCl [Aricept -] 5 mg PO DAILY 07/15/18 


Imipramine HCl 50 mg PO HS 07/15/18 


Allopurinol [Zyloprim -] 100 mg PO DAILY 02/13/19 


Atorvastatin Ca [Lipitor] 40 mg PO HS 02/13/19 


Clopidogrel Bisulfate [Plavix] 75 mg PO DAILY 02/13/19 


Losartan Potassium 50 mg PO DAILY 02/13/19 


Oxybutynin [Oxytrol] 5 each PO DAILY 02/13/19 


Cyclosporine [Restasis] 1 drop OU HS 07/10/19 


Budesonide/Formeterol Fumarate [SYMBICORT 160/4.5mcg -] 1 inh PO BID 07/11/19 


Famotidine [Pepcid] 40 mg PO DAILY 07/11/19 


Folic Acid 1 mg PO DAILY 07/11/19 


Methotrexate [Mexate -] 20 mg PO WEEKLY 07/11/19 


Metoprolol Succinate 25 mg PO DAILY 07/11/19 








This patient is new to me today: No


Emergency Visit: Yes


ED Registration Date: 07/10/19


Care time: The patient presented to the Emergency Department on the above date 

and was hospitalized for further evaluation of their emergent condition.


Critical Care patient: No





- Discharge Referral


Referred to [facility] No





ATTENDING PHYSICIAN STATEMENT





I saw and evaluated the patient.


I reviewed the resident's note and discussed the case with the resident.


I agree with the resident's findings and plan as documented.








SUBJECTIVE:








OBJECTIVE:








ASSESSMENT AND PLAN:

## 2019-07-11 NOTE — PN
Teaching Attending Note


Name of Resident: Mya Miller





ATTENDING PHYSICIAN STATEMENT





I saw and evaluated the patient.


I reviewed the resident's note and discussed the case with the resident.


I agree with the resident's findings and plan as documented.








Please refer to discharge summary for full course but in short Ms Angeles 

presented with weakness and falls. She was found to be hypotensive and with 

BANDAR. Her NSAIDs were held, as were here blood pressure medications except 

metoprolol. She had a renal ultrasound which was normal. She was seen by 

nephrology and hydrated with IVF. Her renal function improved. Her blood 

pressure was observed and she was found to have a max SBP of 141. Because of 

this her metoprolol will be continued and her losartan will be added back. 

However her amlodipine will be held at this time. She is to follow up with Dr Gunn in 1 week for a blood pressure check to see if her medications need to 

be readjusted.





Problem List





- Problems


(1) BANDAR (acute kidney injury)


Code(s): N17.9 - ACUTE KIDNEY FAILURE, UNSPECIFIED   





(2) Weakness


Code(s): R53.1 - WEAKNESS   





(3) HTN (hypertension)


Code(s): I10 - ESSENTIAL (PRIMARY) HYPERTENSION   


Qualifiers: 


   Hypertension type: essential hypertension   Qualified Code(s): I10 - 

Essential (primary) hypertension   





(4) Rheumatoid arthritis


Code(s): M06.9 - RHEUMATOID ARTHRITIS, UNSPECIFIED   





(5) Anemia


Code(s): D64.9 - ANEMIA, UNSPECIFIED   


Qualifiers: 


   Anemia type: unspecified type   Qualified Code(s): D64.9 - Anemia, 

unspecified   





(6) Gout


Code(s): M10.9 - GOUT, UNSPECIFIED